# Patient Record
Sex: FEMALE | Race: WHITE
[De-identification: names, ages, dates, MRNs, and addresses within clinical notes are randomized per-mention and may not be internally consistent; named-entity substitution may affect disease eponyms.]

---

## 2020-11-12 ENCOUNTER — HOSPITAL ENCOUNTER (OUTPATIENT)
Age: 35
End: 2020-11-12
Payer: COMMERCIAL

## 2020-11-12 VITALS — BODY MASS INDEX: 46.1 KG/M2

## 2020-11-12 DIAGNOSIS — Z12.4: Primary | ICD-10-CM

## 2020-11-12 PROCEDURE — 88175 CYTOPATH C/V AUTO FLUID REDO: CPT

## 2020-11-12 PROCEDURE — G0145 SCR C/V CYTO,THINLAYER,RESCR: HCPCS

## 2020-11-12 PROCEDURE — 87624 HPV HI-RISK TYP POOLED RSLT: CPT

## 2024-06-01 ENCOUNTER — APPOINTMENT (OUTPATIENT)
Dept: RADIOLOGY | Facility: HOSPITAL | Age: 39
DRG: 389 | End: 2024-06-01
Payer: COMMERCIAL

## 2024-06-01 ENCOUNTER — HOSPITAL ENCOUNTER (INPATIENT)
Facility: HOSPITAL | Age: 39
LOS: 3 days | Discharge: HOME | DRG: 389 | End: 2024-06-04
Attending: EMERGENCY MEDICINE | Admitting: HOSPITALIST
Payer: COMMERCIAL

## 2024-06-01 DIAGNOSIS — K56.609 SMALL BOWEL OBSTRUCTION (MULTI): Primary | ICD-10-CM

## 2024-06-01 DIAGNOSIS — R11.2 NAUSEA AND VOMITING IN ADULT: ICD-10-CM

## 2024-06-01 PROBLEM — D72.829 LEUKOCYTOSIS: Status: ACTIVE | Noted: 2024-06-01

## 2024-06-01 PROBLEM — R10.84 GENERALIZED ABDOMINAL PAIN: Status: ACTIVE | Noted: 2024-06-01

## 2024-06-01 LAB
ALBUMIN SERPL BCP-MCNC: 4.6 G/DL (ref 3.4–5)
ALP SERPL-CCNC: 91 U/L (ref 33–110)
ALT SERPL W P-5'-P-CCNC: 13 U/L (ref 7–45)
ANION GAP SERPL CALC-SCNC: 12 MMOL/L (ref 10–20)
APPEARANCE UR: CLEAR
AST SERPL W P-5'-P-CCNC: 17 U/L (ref 9–39)
B-HCG SERPL-ACNC: <2 MIU/ML
BASOPHILS # BLD AUTO: 0.03 X10*3/UL (ref 0–0.1)
BASOPHILS NFR BLD AUTO: 0.3 %
BILIRUB DIRECT SERPL-MCNC: 0.1 MG/DL (ref 0–0.3)
BILIRUB SERPL-MCNC: 0.5 MG/DL (ref 0–1.2)
BILIRUB UR STRIP.AUTO-MCNC: NEGATIVE MG/DL
BUN SERPL-MCNC: 16 MG/DL (ref 6–23)
CALCIUM SERPL-MCNC: 9.6 MG/DL (ref 8.6–10.3)
CHLORIDE SERPL-SCNC: 102 MMOL/L (ref 98–107)
CO2 SERPL-SCNC: 26 MMOL/L (ref 21–32)
COLOR UR: YELLOW
CREAT SERPL-MCNC: 1 MG/DL (ref 0.5–1.05)
EGFRCR SERPLBLD CKD-EPI 2021: 74 ML/MIN/1.73M*2
EOSINOPHIL # BLD AUTO: 0.03 X10*3/UL (ref 0–0.7)
EOSINOPHIL NFR BLD AUTO: 0.3 %
ERYTHROCYTE [DISTWIDTH] IN BLOOD BY AUTOMATED COUNT: 13.8 % (ref 11.5–14.5)
GLUCOSE SERPL-MCNC: 116 MG/DL (ref 74–99)
GLUCOSE UR STRIP.AUTO-MCNC: NEGATIVE MG/DL
HCT VFR BLD AUTO: 44.2 % (ref 36–46)
HGB BLD-MCNC: 14.2 G/DL (ref 12–16)
HOLD SPECIMEN: NORMAL
IMM GRANULOCYTES # BLD AUTO: 0.03 X10*3/UL (ref 0–0.7)
IMM GRANULOCYTES NFR BLD AUTO: 0.3 % (ref 0–0.9)
KETONES UR STRIP.AUTO-MCNC: ABNORMAL MG/DL
LACTATE SERPL-SCNC: 1.1 MMOL/L (ref 0.4–2)
LEUKOCYTE ESTERASE UR QL STRIP.AUTO: NEGATIVE
LIPASE SERPL-CCNC: 10 U/L (ref 9–82)
LYMPHOCYTES # BLD AUTO: 1.38 X10*3/UL (ref 1.2–4.8)
LYMPHOCYTES NFR BLD AUTO: 11.5 %
MAGNESIUM SERPL-MCNC: 1.94 MG/DL (ref 1.6–2.4)
MCH RBC QN AUTO: 28.7 PG (ref 26–34)
MCHC RBC AUTO-ENTMCNC: 32.1 G/DL (ref 32–36)
MCV RBC AUTO: 90 FL (ref 80–100)
MONOCYTES # BLD AUTO: 0.51 X10*3/UL (ref 0.1–1)
MONOCYTES NFR BLD AUTO: 4.3 %
NEUTROPHILS # BLD AUTO: 10.02 X10*3/UL (ref 1.2–7.7)
NEUTROPHILS NFR BLD AUTO: 83.3 %
NITRITE UR QL STRIP.AUTO: NEGATIVE
NRBC BLD-RTO: 0 /100 WBCS (ref 0–0)
PH UR STRIP.AUTO: 7 [PH]
PLATELET # BLD AUTO: 236 X10*3/UL (ref 150–450)
POTASSIUM SERPL-SCNC: 4.1 MMOL/L (ref 3.5–5.3)
PROT SERPL-MCNC: 7.3 G/DL (ref 6.4–8.2)
PROT UR STRIP.AUTO-MCNC: NEGATIVE MG/DL
RBC # BLD AUTO: 4.94 X10*6/UL (ref 4–5.2)
RBC # UR STRIP.AUTO: NEGATIVE /UL
SODIUM SERPL-SCNC: 136 MMOL/L (ref 136–145)
SP GR UR STRIP.AUTO: 1.06
UROBILINOGEN UR STRIP.AUTO-MCNC: <2 MG/DL
WBC # BLD AUTO: 12 X10*3/UL (ref 4.4–11.3)

## 2024-06-01 PROCEDURE — 71045 X-RAY EXAM CHEST 1 VIEW: CPT

## 2024-06-01 PROCEDURE — 2550000001 HC RX 255 CONTRASTS: Performed by: EMERGENCY MEDICINE

## 2024-06-01 PROCEDURE — 96361 HYDRATE IV INFUSION ADD-ON: CPT

## 2024-06-01 PROCEDURE — 83605 ASSAY OF LACTIC ACID: CPT | Performed by: EMERGENCY MEDICINE

## 2024-06-01 PROCEDURE — 81003 URINALYSIS AUTO W/O SCOPE: CPT | Performed by: EMERGENCY MEDICINE

## 2024-06-01 PROCEDURE — 1100000001 HC PRIVATE ROOM DAILY

## 2024-06-01 PROCEDURE — 96374 THER/PROPH/DIAG INJ IV PUSH: CPT

## 2024-06-01 PROCEDURE — 85025 COMPLETE CBC W/AUTO DIFF WBC: CPT | Performed by: EMERGENCY MEDICINE

## 2024-06-01 PROCEDURE — 99223 1ST HOSP IP/OBS HIGH 75: CPT | Performed by: HOSPITALIST

## 2024-06-01 PROCEDURE — 74018 RADEX ABDOMEN 1 VIEW: CPT | Mod: FOREIGN READ | Performed by: RADIOLOGY

## 2024-06-01 PROCEDURE — 71045 X-RAY EXAM CHEST 1 VIEW: CPT | Mod: FOREIGN READ | Performed by: RADIOLOGY

## 2024-06-01 PROCEDURE — 2500000001 HC RX 250 WO HCPCS SELF ADMINISTERED DRUGS (ALT 637 FOR MEDICARE OP): Performed by: HOSPITALIST

## 2024-06-01 PROCEDURE — 83735 ASSAY OF MAGNESIUM: CPT | Performed by: HOSPITALIST

## 2024-06-01 PROCEDURE — 2500000005 HC RX 250 GENERAL PHARMACY W/O HCPCS: Performed by: EMERGENCY MEDICINE

## 2024-06-01 PROCEDURE — 36415 COLL VENOUS BLD VENIPUNCTURE: CPT | Performed by: EMERGENCY MEDICINE

## 2024-06-01 PROCEDURE — 84702 CHORIONIC GONADOTROPIN TEST: CPT | Performed by: EMERGENCY MEDICINE

## 2024-06-01 PROCEDURE — 99285 EMERGENCY DEPT VISIT HI MDM: CPT | Mod: 25

## 2024-06-01 PROCEDURE — 74177 CT ABD & PELVIS W/CONTRAST: CPT

## 2024-06-01 PROCEDURE — 2500000004 HC RX 250 GENERAL PHARMACY W/ HCPCS (ALT 636 FOR OP/ED): Performed by: HOSPITALIST

## 2024-06-01 PROCEDURE — 99221 1ST HOSP IP/OBS SF/LOW 40: CPT | Performed by: SURGERY

## 2024-06-01 PROCEDURE — 83690 ASSAY OF LIPASE: CPT | Performed by: EMERGENCY MEDICINE

## 2024-06-01 PROCEDURE — 80053 COMPREHEN METABOLIC PANEL: CPT | Performed by: EMERGENCY MEDICINE

## 2024-06-01 PROCEDURE — 74177 CT ABD & PELVIS W/CONTRAST: CPT | Mod: FOREIGN READ | Performed by: RADIOLOGY

## 2024-06-01 PROCEDURE — 2500000004 HC RX 250 GENERAL PHARMACY W/ HCPCS (ALT 636 FOR OP/ED): Performed by: EMERGENCY MEDICINE

## 2024-06-01 PROCEDURE — 96375 TX/PRO/DX INJ NEW DRUG ADDON: CPT

## 2024-06-01 RX ORDER — ACETAMINOPHEN 325 MG/1
650 TABLET ORAL EVERY 4 HOURS PRN
Status: DISCONTINUED | OUTPATIENT
Start: 2024-06-01 | End: 2024-06-04 | Stop reason: HOSPADM

## 2024-06-01 RX ORDER — PANTOPRAZOLE SODIUM 40 MG/10ML
40 INJECTION, POWDER, LYOPHILIZED, FOR SOLUTION INTRAVENOUS
Status: DISCONTINUED | OUTPATIENT
Start: 2024-06-02 | End: 2024-06-04 | Stop reason: HOSPADM

## 2024-06-01 RX ORDER — MORPHINE SULFATE 2 MG/ML
2 INJECTION, SOLUTION INTRAMUSCULAR; INTRAVENOUS EVERY 4 HOURS PRN
Status: DISCONTINUED | OUTPATIENT
Start: 2024-06-01 | End: 2024-06-03

## 2024-06-01 RX ORDER — SODIUM CHLORIDE 9 MG/ML
125 INJECTION, SOLUTION INTRAVENOUS CONTINUOUS
Status: DISCONTINUED | OUTPATIENT
Start: 2024-06-01 | End: 2024-06-03

## 2024-06-01 RX ORDER — PANTOPRAZOLE SODIUM 40 MG/1
40 TABLET, DELAYED RELEASE ORAL
Status: DISCONTINUED | OUTPATIENT
Start: 2024-06-02 | End: 2024-06-04 | Stop reason: HOSPADM

## 2024-06-01 RX ORDER — ENOXAPARIN SODIUM 100 MG/ML
40 INJECTION SUBCUTANEOUS EVERY 12 HOURS SCHEDULED
Status: DISCONTINUED | OUTPATIENT
Start: 2024-06-01 | End: 2024-06-04 | Stop reason: HOSPADM

## 2024-06-01 RX ORDER — ONDANSETRON HYDROCHLORIDE 2 MG/ML
4 INJECTION, SOLUTION INTRAVENOUS EVERY 8 HOURS PRN
Status: DISCONTINUED | OUTPATIENT
Start: 2024-06-01 | End: 2024-06-04 | Stop reason: HOSPADM

## 2024-06-01 RX ORDER — ACETAMINOPHEN 160 MG/5ML
650 SOLUTION ORAL EVERY 4 HOURS PRN
Status: DISCONTINUED | OUTPATIENT
Start: 2024-06-01 | End: 2024-06-04 | Stop reason: HOSPADM

## 2024-06-01 RX ORDER — ACETAMINOPHEN 650 MG/1
650 SUPPOSITORY RECTAL EVERY 4 HOURS PRN
Status: DISCONTINUED | OUTPATIENT
Start: 2024-06-01 | End: 2024-06-04 | Stop reason: HOSPADM

## 2024-06-01 RX ORDER — POLYETHYLENE GLYCOL 3350 17 G/17G
17 POWDER, FOR SOLUTION ORAL DAILY PRN
Status: DISCONTINUED | OUTPATIENT
Start: 2024-06-01 | End: 2024-06-04 | Stop reason: HOSPADM

## 2024-06-01 RX ORDER — MORPHINE SULFATE 4 MG/ML
6 INJECTION, SOLUTION INTRAMUSCULAR; INTRAVENOUS ONCE
Status: COMPLETED | OUTPATIENT
Start: 2024-06-01 | End: 2024-06-01

## 2024-06-01 RX ORDER — PROCHLORPERAZINE EDISYLATE 5 MG/ML
5 INJECTION INTRAMUSCULAR; INTRAVENOUS ONCE
Status: COMPLETED | OUTPATIENT
Start: 2024-06-01 | End: 2024-06-01

## 2024-06-01 RX ORDER — ONDANSETRON 4 MG/1
4 TABLET, ORALLY DISINTEGRATING ORAL EVERY 8 HOURS PRN
Status: DISCONTINUED | OUTPATIENT
Start: 2024-06-01 | End: 2024-06-04 | Stop reason: HOSPADM

## 2024-06-01 RX ADMIN — Medication 1 SPRAY: at 16:17

## 2024-06-01 RX ADMIN — IOHEXOL 65 ML: 350 INJECTION, SOLUTION INTRAVENOUS at 05:09

## 2024-06-01 RX ADMIN — SODIUM CHLORIDE 125 ML/HR: 9 INJECTION, SOLUTION INTRAVENOUS at 19:30

## 2024-06-01 RX ADMIN — MORPHINE SULFATE 6 MG: 4 INJECTION, SOLUTION INTRAMUSCULAR; INTRAVENOUS at 04:26

## 2024-06-01 RX ADMIN — SODIUM CHLORIDE 1000 ML: 9 INJECTION, SOLUTION INTRAVENOUS at 04:26

## 2024-06-01 RX ADMIN — SODIUM CHLORIDE 125 ML/HR: 9 INJECTION, SOLUTION INTRAVENOUS at 11:10

## 2024-06-01 RX ADMIN — PROCHLORPERAZINE EDISYLATE 5 MG: 5 INJECTION INTRAMUSCULAR; INTRAVENOUS at 04:26

## 2024-06-01 RX ADMIN — ONDANSETRON 4 MG: 2 INJECTION INTRAMUSCULAR; INTRAVENOUS at 11:06

## 2024-06-01 RX ADMIN — BENZOCAINE, BUTAMBEN, AND TETRACAINE HYDROCHLORIDE 2 SPRAY: .028; .004; .004 AEROSOL, SPRAY TOPICAL at 08:53

## 2024-06-01 SDOH — SOCIAL STABILITY: SOCIAL INSECURITY: DOES ANYONE TRY TO KEEP YOU FROM HAVING/CONTACTING OTHER FRIENDS OR DOING THINGS OUTSIDE YOUR HOME?: NO

## 2024-06-01 SDOH — SOCIAL STABILITY: SOCIAL INSECURITY: HAVE YOU HAD ANY THOUGHTS OF HARMING ANYONE ELSE?: NO

## 2024-06-01 SDOH — SOCIAL STABILITY: SOCIAL INSECURITY: DO YOU FEEL UNSAFE GOING BACK TO THE PLACE WHERE YOU ARE LIVING?: NO

## 2024-06-01 SDOH — SOCIAL STABILITY: SOCIAL INSECURITY: ABUSE: ADULT

## 2024-06-01 SDOH — SOCIAL STABILITY: SOCIAL INSECURITY: HAS ANYONE EVER THREATENED TO HURT YOUR FAMILY OR YOUR PETS?: NO

## 2024-06-01 SDOH — SOCIAL STABILITY: SOCIAL INSECURITY: ARE THERE ANY APPARENT SIGNS OF INJURIES/BEHAVIORS THAT COULD BE RELATED TO ABUSE/NEGLECT?: NO

## 2024-06-01 SDOH — SOCIAL STABILITY: SOCIAL INSECURITY: ARE YOU OR HAVE YOU BEEN THREATENED OR ABUSED PHYSICALLY, EMOTIONALLY, OR SEXUALLY BY ANYONE?: NO

## 2024-06-01 SDOH — SOCIAL STABILITY: SOCIAL INSECURITY: HAVE YOU HAD THOUGHTS OF HARMING ANYONE ELSE?: NO

## 2024-06-01 SDOH — SOCIAL STABILITY: SOCIAL INSECURITY: DO YOU FEEL ANYONE HAS EXPLOITED OR TAKEN ADVANTAGE OF YOU FINANCIALLY OR OF YOUR PERSONAL PROPERTY?: NO

## 2024-06-01 ASSESSMENT — LIFESTYLE VARIABLES
HOW MANY STANDARD DRINKS CONTAINING ALCOHOL DO YOU HAVE ON A TYPICAL DAY: PATIENT DOES NOT DRINK
HOW OFTEN DO YOU HAVE A DRINK CONTAINING ALCOHOL: NEVER
SKIP TO QUESTIONS 9-10: 1
HOW OFTEN DO YOU HAVE 6 OR MORE DRINKS ON ONE OCCASION: NEVER
AUDIT-C TOTAL SCORE: 0
AUDIT-C TOTAL SCORE: 0

## 2024-06-01 ASSESSMENT — COGNITIVE AND FUNCTIONAL STATUS - GENERAL
MOBILITY SCORE: 24
DAILY ACTIVITIY SCORE: 24
MOBILITY SCORE: 24
PATIENT BASELINE BEDBOUND: NO

## 2024-06-01 ASSESSMENT — PAIN - FUNCTIONAL ASSESSMENT
PAIN_FUNCTIONAL_ASSESSMENT: 0-10

## 2024-06-01 ASSESSMENT — ACTIVITIES OF DAILY LIVING (ADL)
ADEQUATE_TO_COMPLETE_ADL: YES
PATIENT'S MEMORY ADEQUATE TO SAFELY COMPLETE DAILY ACTIVITIES?: YES
WALKS IN HOME: INDEPENDENT
JUDGMENT_ADEQUATE_SAFELY_COMPLETE_DAILY_ACTIVITIES: YES
LACK_OF_TRANSPORTATION: PATIENT DECLINED
DRESSING YOURSELF: INDEPENDENT
GROOMING: INDEPENDENT
HEARING - RIGHT EAR: FUNCTIONAL
FEEDING YOURSELF: INDEPENDENT
BATHING: INDEPENDENT
HEARING - LEFT EAR: FUNCTIONAL
TOILETING: INDEPENDENT

## 2024-06-01 ASSESSMENT — PAIN SCALES - GENERAL
PAINLEVEL_OUTOF10: 7
PAINLEVEL_OUTOF10: 0 - NO PAIN
PAINLEVEL_OUTOF10: 1
PAINLEVEL_OUTOF10: 5 - MODERATE PAIN
PAINLEVEL_OUTOF10: 7

## 2024-06-01 ASSESSMENT — COLUMBIA-SUICIDE SEVERITY RATING SCALE - C-SSRS
1. IN THE PAST MONTH, HAVE YOU WISHED YOU WERE DEAD OR WISHED YOU COULD GO TO SLEEP AND NOT WAKE UP?: NO
2. HAVE YOU ACTUALLY HAD ANY THOUGHTS OF KILLING YOURSELF?: NO
6. HAVE YOU EVER DONE ANYTHING, STARTED TO DO ANYTHING, OR PREPARED TO DO ANYTHING TO END YOUR LIFE?: NO

## 2024-06-01 ASSESSMENT — PAIN DESCRIPTION - LOCATION: LOCATION: ABDOMEN

## 2024-06-01 ASSESSMENT — PATIENT HEALTH QUESTIONNAIRE - PHQ9
1. LITTLE INTEREST OR PLEASURE IN DOING THINGS: NOT AT ALL
2. FEELING DOWN, DEPRESSED OR HOPELESS: NOT AT ALL
SUM OF ALL RESPONSES TO PHQ9 QUESTIONS 1 & 2: 0

## 2024-06-01 NOTE — CONSULTS
General Surgery Consultation    Patient: Jennie Melendez  : 1985  MRN: [id number]  Date of Consultation: 24    Primary Care Provider: Karthikeyan Lopez PA-C  Referring Provider: Karthikeyan Caballero MD    Chief Complaint: Abdominal pain, vomiting    History of Present Illness: Jennie Melendez is a 38 y.o. old female seen at the request of Dr. Caballero for evaluation of a small bowel obstruction.  She presented to the emergency department this morning with abdominal pain, nausea and vomiting that began yesterday.  She was traveling home from [place].  She had a sandwich on the way home.  She subsequently developed abdominal pain.  They stopped at a rest stop where she had a bowel movement.  This was nonbloody and soft but formed.  She subsequently started vomiting.  Pain persisted, prompting her to come to the emergency department.  She has not passed flatus since onset of pain.  She denies any similar pain in the past.  She has no history of previous abdominal surgery.  She has no previous bowel obstructions.  She has no personal or family history of inflammatory bowel disease or intra-abdominal malignancy.  In the ED, CT scan showed dilated loops of small bowel with a transition point in the left lower quadrant consistent with a small bowel obstruction.  Nasogastric tube was placed.  Her pain remains wavelike in nature but is improved compared to when she initially came in.    Medical History:  Obesity, BMI 45    Surgical History:  No previous abdominal surgery  [place] tooth extraction    Home Medications:  No regular home medications    Allergies:  No Known Allergies    Family History:   Sister with autoimmune disease.  No family history of inflammatory bowel disease or intra-abdominal malignancy.    Social History:  Non-smoker.  No alcohol or drug use.  .  Has 5 children and is currently breast-feeding.    ROS:  Constitutional:  no fever, sweats, and chills  Cardiovascular: No chest  "pain  Respiratory: No cough or shortness of breath  Gastrointestinal: + Abdominal pain, nausea, vomiting, obstipation.  No chronic issues with diarrhea or blood in the stool.  Genitourinary: no dysuria  Musculoskeletal: no weakness or swelling  Integumentary: no jaundice  Neurological: no confusion  Endocrine: no heat or cold intolerance  Heme/Lymph: no easy bruising or bleeding    Objective:  /80   Pulse 80   Temp 37 °C (98.6 °F) (Oral)   Resp 18   Ht 1.626 m (5' 4\")   Wt 118 kg (260 lb)   SpO2 98%   BMI 44.63 kg/m²     Physical Exam:  Constitutional: No acute distress, conversant, pleasant,  at bedside  Neurologic: alert and oriented  Psych: appropriate affect  Ears, Nose, Mouth and Throat: mucus membranes moist  Pulmonary: No labored breathing  Cardiovascular: Regular rate and rhythm  Abdomen: soft, non-distended although obese with BMI 45, no surgical scars, minimally tender to palpation in the left mid abdomen, no peritonitis, nasogastric tube in place to low wall suction with translucent light bilious output  Musculoskeletal: Moves all extremities, no edema  Skin: no jaundice    Labs:  WBC 12, hemoglobin 14.2, platelets 236, CMP within normal limits, lactic acid 1.1, lipase normal    Imaging:  CT abdomen and pelvis reviewed: Dilated loops of bowel with transition point in left abdomen, consistent with small bowel obstruction.    Assessment and Plan: Jennie Melendez is a 38 y.o. old female with a small bowel obstruction.  No evidence of bowel ischemia or closed-loop nature to the obstruction.  I recommend keeping her NPO and nasogastric tube decompression.  Her electrolytes are currently within normal limits, but recommend repeating labs tomorrow and replacing any electrolytes if needed.  If no resolution within 48 hours, will obtain a small bowel follow-through study on Monday with potential surgery on Tuesday if still not resolved.    Hue Rocha MD  6/1/2024    "

## 2024-06-01 NOTE — PROGRESS NOTES
Surgery    Full consult to follow. Patient with abdominal pain and vomiting. CT scan shows SBO. Recommend admission to Hospitalist service, NPO/NGT decompression. If no resolution in 48 hours, will get SBFT on Monday and if remains obstructed would tentatively plan for surgery on Tuesday.    Hue Rocha MD  06/01/24  7:06 AM

## 2024-06-01 NOTE — H&P
History Of Present Illness  Jennie Melendez is a 38 y.o. female with no significant past medical history, G5, P5, normal delivery.  Presented to the emergency room with intractable nausea, vomiting and abdominal pain, which started 1 day ago.  Patient was driving a car from [place] yesterday, on the way she had chicken sandwich.  Followed by abdominal discomfort, nausea vomiting.  Multiple episodes of bile is vomiting.  Generalized abdominal pain, nonradiating, 8/10 in intensity.  Had 1 bowel movement yesterday prior to the symptoms.  No prior history of small bowel obstruction.  Denied any fevers or chills.  No chest pain or shortness of breath.  Patient was evaluated in the emergency room, initial BMP normal.  UA, chest x-ray negative.  White count 14.2, platelet 236.  Lactic acid 1.1.  Lipase normal.  CT of abdomen and pelvis reviewed shows dilated loops of bowel with transition point of left abdomen consistent with small bowel obstruction.  Case was discussed by ER with Dr. Rocha, general surgery.  Patient currently with NG tube, got her pain medication and nausea medication.  Feeling a little better.  No passing gas.  No fevers or chills.  No other complaints.    Past Medical History  None    Surgical History  None     Social History  She reports that she has never smoked. She does not have any smokeless tobacco history on file. She reports that she does not currently use alcohol. She reports that she does not use drugs.    Family History  No family history on file.     Allergies  Patient has no known allergies.    Review of Systems  All the 10 point review of systems have been reviewed are negative except as stated in HPI.    Physical Exam   Gen: NAD, A&O x 3, Well developed  HEENT: Normal size, shape; EOMI intact. Sclera normal.Ears normal.Oropharynx pink and moist.  Neck: Supple.no masses noted in neck, no lymphadenopathy, no tracheal deviation, non-palpable thyroid.No neck rigidity.  Chest: chest symmetry  "with respirations, no wheezes, crackles  CVS: RRR, no rubs  Abd: soft, NT/ND, bowel sounds absent.    Ext: no Clubbing/Cyanosis/edema, non-tender.Pulse 2+. Homans Negative.  Skin: Dry, warm, good condition  Neuro: grossly normal  Psy: Mood and affect appropriate   Last Recorded Vitals  Blood pressure 122/81, pulse 83, temperature 36.4 °C (97.5 °F), temperature source Temporal, resp. rate 16, height 1.626 m (5' 4\"), weight 118 kg (260 lb), SpO2 99%.    Assessment/Plan   Principal Problem:    Generalized abdominal pain  Active Problems:    Small bowel obstruction (Multi)    Nausea and vomiting in adult    Leukocytosis      Plan;    Admit to hospital, observation status.  Strict ins and outs, daily weights.  N.p.o., started on IV fluids at 125 mill per hour.  However.  Continue with NG tube with intermittent suction.  IV morphine, Tylenol for pain.  IV Zofran for nausea.  General surgery consult.  UA, chest x-ray negative.  Continue to monitor white count, reactive due to small bowel obstruction.  Labs in AM.    DVT prophylax on Lovenox.  CODE STATUS full.  Disposition in 2 to 3 days.    I spent 45 minutes in the professional and overall care of this patient.      Kiko Richards MD    "

## 2024-06-01 NOTE — ED PROVIDER NOTES
38-year-old female chief complaint of abdominal pain.  States they were out of state in [place] picking up vehicle when the symptoms began.  They ate a [employer] a little after 8 and shortly after that started having abdominal pain.  Multiple episodes of emesis.  No diarrhea.         Review of Systems     Physical Exam  Vitals and nursing note reviewed.   Constitutional:       General: She is in acute distress.      Appearance: She is well-developed.   HENT:      Head: Normocephalic and atraumatic.   Eyes:      Conjunctiva/sclera: Conjunctivae normal.   Cardiovascular:      Rate and Rhythm: Normal rate and regular rhythm.      Heart sounds: No murmur heard.  Pulmonary:      Effort: Pulmonary effort is normal. No respiratory distress.      Breath sounds: Normal breath sounds.   Abdominal:      Palpations: Abdomen is soft.      Tenderness: There is generalized abdominal tenderness.   Musculoskeletal:         General: No swelling.      Cervical back: Neck supple.   Skin:     General: Skin is warm and dry.      Capillary Refill: Capillary refill takes less than 2 seconds.   Neurological:      Mental Status: She is alert.   Psychiatric:         Mood and Affect: Mood normal.          Labs Reviewed   GRAY TOP   URINALYSIS WITH REFLEX CULTURE AND MICROSCOPIC    Narrative:     The following orders were created for panel order Urinalysis with Reflex Culture and Microscopic.  Procedure                               Abnormality         Status                     ---------                               -----------         ------                     Urinalysis with Reflex C...[[id number]]                                                 Extra Urine Gray Tube[[id number]]                                                         Please view results for these tests on the individual orders.   HUMAN CHORIONIC GONADOTROPIN, SERUM QUANTITATIVE   CBC WITH AUTO DIFFERENTIAL   BASIC METABOLIC PANEL   LIPASE   HEPATIC FUNCTION PANEL   LACTATE    URINALYSIS WITH REFLEX CULTURE AND MICROSCOPIC   EXTRA URINE GRAY TUBE        CT abdomen pelvis w IV contrast    (Results Pending)        Procedures     Medical Decision Making  Patient presents with abdominal pain and several bouts of emesis.  CT scan was concerning for developing small bowel obstruction.  I spoke to surgeon on-call Megan Sippy recommended NG tube placement and admit to the hospitalist service.    DDx: Perforated bowel, small bowel obstruction, diverticulitis         Diagnoses as of 06/08/24 2052   Small bowel obstruction (Multi)                    Karthikeyan Caballero MD  06/08/24 2052

## 2024-06-02 ENCOUNTER — APPOINTMENT (OUTPATIENT)
Dept: RADIOLOGY | Facility: HOSPITAL | Age: 39
DRG: 389 | End: 2024-06-02
Payer: COMMERCIAL

## 2024-06-02 LAB
ANION GAP SERPL CALC-SCNC: 10 MMOL/L (ref 10–20)
BUN SERPL-MCNC: 12 MG/DL (ref 6–23)
CALCIUM SERPL-MCNC: 8.4 MG/DL (ref 8.6–10.3)
CHLORIDE SERPL-SCNC: 109 MMOL/L (ref 98–107)
CO2 SERPL-SCNC: 26 MMOL/L (ref 21–32)
CREAT SERPL-MCNC: 0.89 MG/DL (ref 0.5–1.05)
EGFRCR SERPLBLD CKD-EPI 2021: 85 ML/MIN/1.73M*2
ERYTHROCYTE [DISTWIDTH] IN BLOOD BY AUTOMATED COUNT: 14.1 % (ref 11.5–14.5)
GLUCOSE SERPL-MCNC: 87 MG/DL (ref 74–99)
HCT VFR BLD AUTO: 39.1 % (ref 36–46)
HGB BLD-MCNC: 12.2 G/DL (ref 12–16)
MCH RBC QN AUTO: 28.5 PG (ref 26–34)
MCHC RBC AUTO-ENTMCNC: 31.2 G/DL (ref 32–36)
MCV RBC AUTO: 91 FL (ref 80–100)
NRBC BLD-RTO: 0 /100 WBCS (ref 0–0)
PLATELET # BLD AUTO: 199 X10*3/UL (ref 150–450)
POTASSIUM SERPL-SCNC: 3.5 MMOL/L (ref 3.5–5.3)
RBC # BLD AUTO: 4.28 X10*6/UL (ref 4–5.2)
SODIUM SERPL-SCNC: 141 MMOL/L (ref 136–145)
WBC # BLD AUTO: 6 X10*3/UL (ref 4.4–11.3)

## 2024-06-02 PROCEDURE — C9113 INJ PANTOPRAZOLE SODIUM, VIA: HCPCS | Performed by: HOSPITALIST

## 2024-06-02 PROCEDURE — 85027 COMPLETE CBC AUTOMATED: CPT | Performed by: HOSPITALIST

## 2024-06-02 PROCEDURE — 99233 SBSQ HOSP IP/OBS HIGH 50: CPT | Performed by: HOSPITALIST

## 2024-06-02 PROCEDURE — 36415 COLL VENOUS BLD VENIPUNCTURE: CPT | Performed by: HOSPITALIST

## 2024-06-02 PROCEDURE — 71045 X-RAY EXAM CHEST 1 VIEW: CPT | Performed by: RADIOLOGY

## 2024-06-02 PROCEDURE — 80048 BASIC METABOLIC PNL TOTAL CA: CPT | Performed by: HOSPITALIST

## 2024-06-02 PROCEDURE — 74018 RADEX ABDOMEN 1 VIEW: CPT | Performed by: RADIOLOGY

## 2024-06-02 PROCEDURE — 1100000001 HC PRIVATE ROOM DAILY

## 2024-06-02 PROCEDURE — 74018 RADEX ABDOMEN 1 VIEW: CPT

## 2024-06-02 PROCEDURE — 2500000001 HC RX 250 WO HCPCS SELF ADMINISTERED DRUGS (ALT 637 FOR MEDICARE OP): Performed by: HOSPITALIST

## 2024-06-02 PROCEDURE — 2500000004 HC RX 250 GENERAL PHARMACY W/ HCPCS (ALT 636 FOR OP/ED): Performed by: HOSPITALIST

## 2024-06-02 PROCEDURE — 99232 SBSQ HOSP IP/OBS MODERATE 35: CPT | Performed by: SURGERY

## 2024-06-02 RX ADMIN — ACETAMINOPHEN 650 MG: 325 TABLET ORAL at 12:51

## 2024-06-02 RX ADMIN — ACETAMINOPHEN 650 MG: 160 SOLUTION ORAL at 20:23

## 2024-06-02 RX ADMIN — PANTOPRAZOLE SODIUM 40 MG: 40 INJECTION, POWDER, FOR SOLUTION INTRAVENOUS at 08:53

## 2024-06-02 ASSESSMENT — COGNITIVE AND FUNCTIONAL STATUS - GENERAL
MOBILITY SCORE: 24
DAILY ACTIVITIY SCORE: 24

## 2024-06-02 ASSESSMENT — PAIN - FUNCTIONAL ASSESSMENT
PAIN_FUNCTIONAL_ASSESSMENT: 0-10

## 2024-06-02 ASSESSMENT — PAIN DESCRIPTION - LOCATION
LOCATION: HEAD
LOCATION: HEAD

## 2024-06-02 ASSESSMENT — PAIN SCALES - GENERAL
PAINLEVEL_OUTOF10: 3
PAINLEVEL_OUTOF10: 0 - NO PAIN
PAINLEVEL_OUTOF10: 2

## 2024-06-02 NOTE — PROGRESS NOTES
Jennie Melendez is a 38 y.o. female on day 1 of admission presenting with Generalized abdominal pain.      Subjective   Seen in room, bedside.  States her last morphine dose was yesterday evening.  She is feeling a lot better.  No abdominal pain, nausea vomiting.  No passing gas.  NG tube had 400+300+300 mL output since admission.  Per nursing no other acute events.  Objective     Last Recorded Vitals  /72   Pulse 63   Temp 36.1 °C (97 °F) (Temporal)   Resp 18   Wt 118 kg (260 lb)   SpO2 96%   Intake/Output last 3 Shifts:    Intake/Output Summary (Last 24 hours) at 6/2/2024 0946  Last data filed at 6/2/2024 0913  Gross per 24 hour   Intake 2372.92 ml   Output 725 ml   Net 1647.92 ml       Admission Weight  Weight: 118 kg (260 lb) (06/01/24 0408)    Daily Weight  06/01/24 : 118 kg (260 lb)    Image Results  XR chest abdomen for OG NG placement  Narrative: STUDY:  Single View Chest and Abdomen Radiographs;  6/1/2024 at 9:14 AM.  INDICATION:  NG placement.  COMPARISON:  CT AP 6/1/2024.  ACCESSION NUMBER(S):  [id number]  ORDERING CLINICIAN:  TECHNIQUE:  Single view of the chest and one view(s) of the abdomen  (two images).  FINDINGS:    CARDIOMEDIASTINAL SILHOUETTE:  Cardiomediastinal silhouette is normal in size and configuration.     LUNGS:  Lungs are clear.  Lung apices are excluded from the field-of-view.     ABDOMEN:  Bowel gas pattern is normal without obstruction or ileus.  There are  no convincing calculi or abnormal calcifications.  NG tube present  with tip in mid stomach.     BONES:  No acute osseous changes  Impression: NG tube present with tip in mid stomach.  Signed by Demetrius Radford MD  CT abdomen pelvis w IV contrast  Narrative: STUDY:  CT Abdomen and Pelvis with IV Contrast; 06/01/2024 5:10 AM  INDICATION:  Abdominal pain.  COMPARISON:  None.  ACCESSION NUMBER(S):  [id number]  ORDERING CLINICIAN:  CARIE ALONSO  TECHNIQUE:  CT of the abdomen and pelvis was performed.  Contiguous axial  images  were obtained at 3 mm slice thickness through the abdomen and pelvis.   Coronal and sagittal reconstructions at 3 mm slice thickness were  performed.  Omnipaque 350 65 mL was administered intravenously.    FINDINGS:  LOWER CHEST:  No cardiomegaly.  No pericardial effusion.  Lung bases are clear.     ABDOMEN:     LIVER:  No hepatomegaly.  Smooth surface contour.  Normal attenuation.     BILE DUCTS:  No intrahepatic or extrahepatic biliary ductal dilatation.     GALLBLADDER:  The gallbladder is normal.  STOMACH:  No abnormalities identified.     PANCREAS:  No masses or ductal dilatation.     SPLEEN:  No splenomegaly or focal splenic lesion.     ADRENAL GLANDS:  No thickening or nodules.     KIDNEYS AND URETERS:  Kidneys are normal in size and location.  No renal or ureteral  calculi.     PELVIS:     BLADDER:  No abnormalities identified.     REPRODUCTIVE ORGANS:  No abnormalities identified.     BOWEL:  Upper GI tract is unremarkable. There are multiple dilated loops of  small bowel, with transition to decompressed bowel occurring in the  left lower quadrant. No evidence of interloop fluid or pneumatosis.  Distal small bowel is decompressed. Colon is unremarkable.     VESSELS:  No abnormalities identified.  Abdominal aorta is normal in caliber.      PERITONEUM/RETROPERITONEUM/LYMPH NODES:  No free fluid.  No pneumoperitoneum.  No lymphadenopathy.     ABDOMINAL WALL:  No abnormalities identified.  SOFT TISSUES:   Small fat-containing umbilical hernia     BONES:  No acute fracture or aggressive osseous lesion.  Impression: Findings suspicious for developing small bowel obstruction.  Signed by Salbador Thompson MD      Physical Exam   Gen: NAD, A&O x 3, Well developed  HEENT: Normal size, shape; EOMI intact. Sclera normal.Ears normal.Oropharynx pink and moist.  Neck: Supple.no masses noted in neck, no lymphadenopathy, no tracheal deviation, non-palpable thyroid.No neck rigidity.  Chest: chest symmetry with  respirations, no wheezes, crackles  CVS: RRR, no rubs  Abd: soft, NT/ND, hypoactive bowel sounds.  Ext: no Clubbing/Cyanosis/edema, non-tender.Pulse 2+. Homans Negative.  Skin: Dry, warm, good condition  Neuro: grossly normal  Psy: Mood and affect appropriate  Assessment/Plan      Principal Problem:    Generalized abdominal pain  Active Problems:    Small bowel obstruction (Multi)    Nausea and vomiting in adult    Leukocytosis    Plan    Potassium 3.5, magnesium 1.94.  Normal white count.  6.0.  Continue on NG with intermittent suction.  NPO.  Clinically improving.  Further recommendations from general surgery.  Continue with IV Zofran, IV morphine as needed.  Continue with hydration.  Recheck labs in AM.    DVT prophylax on Lovenox.  CODE STATUS full.  Disposition in 1 to 2 days.    Total time spent 35 minutes.  Kiko Richards MD

## 2024-06-02 NOTE — PROGRESS NOTES
"General Surgery Progress Note    Abdominal pain has improved.  She passed flatus 1 time, very small.  No bowel movement.  Nasogastric tube with 725 cc output in 24 hours.  She has been ambulating in the hallways.    /72   Pulse 63   Temp 36.1 °C (97 °F) (Temporal)   Resp 18   Ht 1.626 m (5' 4\")   Wt 118 kg (260 lb)   SpO2 96%   BMI 44.63 kg/m²   NAD, sitting upright on side of bed  No labored breathing, on room air   NSR  Abdomen soft, obese but nondistended, nontender, NG tube to low intermittent suction with very light bilious/translucent output.    Intake/Output Summary (Last 24 hours) at 6/2/2024 1058  Last data filed at 6/2/2024 0913  Gross per 24 hour   Intake 2372.92 ml   Output 725 ml   Net 1647.92 ml     WBC 6  Cr 0.89, K 3.5    Patient is a 38 y.o. female with small bowel obstruction.  I suspect that she may be resolving.  We discussed the option of removing NG tube this morning with the caveat that if she does not tolerate it, it would need reinserted.  Alternatively, we discussed just leaving it in until she passes more flatus or has a bowel movement.  She would prefer the latter.  We will therefore leave NG tube to low intermittent suction today.  If she were to have a bowel movement, please notify me and we can reevaluate.  Otherwise, we will continue with NPO/NGT decompression and plan for small bowel follow-through tomorrow morning.    Hue Rocha MD  06/02/24  11:03 AM          "

## 2024-06-02 NOTE — NURSING NOTE
NG tube at 54cm, originally at 60cm when inserted. Dr. Richards notified via secure chat. STAT xray ordered to verify placement.

## 2024-06-03 ENCOUNTER — APPOINTMENT (OUTPATIENT)
Dept: RADIOLOGY | Facility: HOSPITAL | Age: 39
DRG: 389 | End: 2024-06-03
Payer: COMMERCIAL

## 2024-06-03 PROCEDURE — 74250 X-RAY XM SM INT 1CNTRST STD: CPT | Performed by: RADIOLOGY

## 2024-06-03 PROCEDURE — C9113 INJ PANTOPRAZOLE SODIUM, VIA: HCPCS | Performed by: HOSPITALIST

## 2024-06-03 PROCEDURE — 74250 X-RAY XM SM INT 1CNTRST STD: CPT

## 2024-06-03 PROCEDURE — 99233 SBSQ HOSP IP/OBS HIGH 50: CPT | Performed by: HOSPITALIST

## 2024-06-03 PROCEDURE — 2550000001 HC RX 255 CONTRASTS: Performed by: HOSPITALIST

## 2024-06-03 PROCEDURE — 1100000001 HC PRIVATE ROOM DAILY

## 2024-06-03 PROCEDURE — 99232 SBSQ HOSP IP/OBS MODERATE 35: CPT | Performed by: SURGERY

## 2024-06-03 PROCEDURE — 2500000004 HC RX 250 GENERAL PHARMACY W/ HCPCS (ALT 636 FOR OP/ED): Performed by: HOSPITALIST

## 2024-06-03 RX ADMIN — DIATRIZOATE MEGLUMINE AND DIATRIZOATE SODIUM 120 ML: 660; 100 LIQUID ORAL; RECTAL at 08:50

## 2024-06-03 RX ADMIN — DIATRIZOATE MEGLUMINE AND DIATRIZOATE SODIUM 240 ML: 660; 100 LIQUID ORAL; RECTAL at 08:47

## 2024-06-03 RX ADMIN — PANTOPRAZOLE SODIUM 40 MG: 40 INJECTION, POWDER, FOR SOLUTION INTRAVENOUS at 09:00

## 2024-06-03 RX ADMIN — SODIUM CHLORIDE 125 ML/HR: 9 INJECTION, SOLUTION INTRAVENOUS at 10:02

## 2024-06-03 SDOH — ECONOMIC STABILITY: GENERAL

## 2024-06-03 SDOH — ECONOMIC STABILITY: FOOD INSECURITY: WITHIN THE PAST 12 MONTHS, YOU WORRIED THAT YOUR FOOD WOULD RUN OUT BEFORE YOU GOT MONEY TO BUY MORE.: NEVER TRUE

## 2024-06-03 SDOH — ECONOMIC STABILITY: FOOD INSECURITY

## 2024-06-03 SDOH — ECONOMIC STABILITY: FOOD INSECURITY: WITHIN THE PAST 12 MONTHS, THE FOOD YOU BOUGHT JUST DIDN'T LAST AND YOU DIDN'T HAVE MONEY TO GET MORE.: NEVER TRUE

## 2024-06-03 SDOH — ECONOMIC STABILITY: TRANSPORTATION INSECURITY

## 2024-06-03 SDOH — ECONOMIC STABILITY: TRANSPORTATION INSECURITY
IN THE PAST 12 MONTHS, HAS THE LACK OF TRANSPORTATION KEPT YOU FROM MEDICAL APPOINTMENTS OR FROM GETTING MEDICATIONS?: NO

## 2024-06-03 SDOH — ECONOMIC STABILITY: FOOD INSECURITY: WITHIN THE PAST 12 MONTHS, YOU WORRIED THAT YOUR FOOD WOULD RUN OUT BEFORE YOU GOT THE MONEY TO BUY MORE.: NEVER TRUE

## 2024-06-03 SDOH — ECONOMIC STABILITY: TRANSPORTATION INSECURITY
IN THE PAST 12 MONTHS, HAS LACK OF TRANSPORTATION KEPT YOU FROM MEETINGS, WORK, OR FROM GETTING THINGS NEEDED FOR DAILY LIVING?: NO

## 2024-06-03 SDOH — ECONOMIC STABILITY: HOUSING INSECURITY

## 2024-06-03 SDOH — ECONOMIC STABILITY: HOUSING INSECURITY: IN THE LAST 12 MONTHS, WAS THERE A TIME WHEN YOU WERE NOT ABLE TO PAY THE MORTGAGE OR RENT ON TIME?: NO

## 2024-06-03 SDOH — ECONOMIC STABILITY: INCOME INSECURITY: IN THE LAST 12 MONTHS, WAS THERE A TIME WHEN YOU WERE NOT ABLE TO PAY THE MORTGAGE OR RENT ON TIME?: NO

## 2024-06-03 SDOH — ECONOMIC STABILITY: HOUSING INSECURITY: IN THE LAST 12 MONTHS, HOW MANY PLACES HAVE YOU LIVED?: 1

## 2024-06-03 SDOH — ECONOMIC STABILITY: HOUSING INSECURITY
IN THE LAST 12 MONTHS, WAS THERE A TIME WHEN YOU DID NOT HAVE A STEADY PLACE TO SLEEP OR SLEPT IN A SHELTER (INCLUDING NOW)?: NO

## 2024-06-03 SDOH — ECONOMIC STABILITY: HOUSING INSECURITY: IN THE PAST 12 MONTHS HAS THE ELECTRIC, GAS, OIL, OR WATER COMPANY THREATENED TO SHUT OFF SERVICES IN YOUR HOME?: NO

## 2024-06-03 SDOH — ECONOMIC STABILITY: FOOD INSECURITY: WITHIN THE PAST 12 MONTHS, THE FOOD YOU BOUGHT JUST DIDN’T LAST AND YOU DIDN’T HAVE MONEY TO GET MORE.: NEVER TRUE

## 2024-06-03 SDOH — ECONOMIC STABILITY: TRANSPORTATION INSECURITY: IN THE PAST 12 MONTHS, HAS LACK OF TRANSPORTATION KEPT YOU FROM MEDICAL APPOINTMENTS OR FROM GETTING MEDICATIONS?: NO

## 2024-06-03 ASSESSMENT — COGNITIVE AND FUNCTIONAL STATUS - GENERAL: MOBILITY SCORE: 24

## 2024-06-03 ASSESSMENT — PAIN SCALES - GENERAL
PAINLEVEL_OUTOF10: 0 - NO PAIN
PAINLEVEL_OUTOF10: 0 - NO PAIN

## 2024-06-03 ASSESSMENT — SOCIAL DETERMINANTS OF HEALTH (SDOH): IN THE PAST 12 MONTHS, HAS THE ELECTRIC, GAS, OIL, OR WATER COMPANY THREATENED TO SHUT OFF SERVICE IN YOUR HOME?: NO

## 2024-06-03 ASSESSMENT — ACTIVITIES OF DAILY LIVING (ADL): LACK_OF_TRANSPORTATION: NO

## 2024-06-03 NOTE — PROGRESS NOTES
Jennie Melendez is a 38 y.o. female on day 2 of admission presenting with Generalized abdominal pain.      Subjective   Patient is seen after her small bowel follow-through.  Continue to have some nausea.  Passing gas.  No bowel movement.  Had NG secretion 575 mL.  No fevers or chills.  No other complaints.    Objective     Last Recorded Vitals  /75   Pulse 83   Temp 36 °C (96.8 °F)   Resp 20   Wt 118 kg (260 lb)   SpO2 99%   Intake/Output last 3 Shifts:    Intake/Output Summary (Last 24 hours) at 6/3/2024 0958  Last data filed at 6/3/2024 0645  Gross per 24 hour   Intake 1191.67 ml   Output 1025 ml   Net 166.67 ml       Admission Weight  Weight: 118 kg (260 lb) (06/01/24 0408)    Daily Weight  06/01/24 : 118 kg (260 lb)    Image Results  XR chest abdomen for OG NG placement  Narrative: Interpreted By:  Petar Still,   STUDY:  XR CHEST ABDOMEN FOR OG NG PLACEMENT; 6/2/2024 1:13 pm      INDICATION:  Signs/Symptoms:NG tube placement.      COMPARISON:  None.      ACCESSION NUMBER(S):  [id number]      ORDERING CLINICIAN:  REA LALA      FINDINGS:  A single AP radiograph of the abdomen was obtained. An enteric tube  is seen with the tip overlying the mid stomach. There is a  nonobstructive bowel gas pattern present. Free intraperitoneal air  and air-fluid levels cannot be excluded without upright or decubitus  images.      Impression: Nonobstructive bowel gas pattern.      MACRO:  None      Signed by: Petar Still 6/2/2024 1:34 PM  Dictation workstation:   [id number]      Physical Exam   Gen: NAD, A&O x 3, Well developed  HEENT: Normal size, shape; EOMI intact. Sclera normal.Ears normal.Oropharynx pink and moist.  Neck: Supple.no masses noted in neck, no lymphadenopathy, no tracheal deviation, non-palpable thyroid.No neck rigidity.  Chest: chest symmetry with respirations, no wheezes, crackles  CVS: RRR, no rubs  Abd: soft, NT/ND, normal bowel sounds.  Ext: no Clubbing/Cyanosis/edema,  non-tender.Pulse 2+. Homans Negative.  Skin: Dry, warm, good condition  Neuro: grossly normal  Psy: Mood and affect appropriate    Assessment/Plan    Principal Problem:    Generalized abdominal pain  Active Problems:    Small bowel obstruction (Multi)    Nausea and vomiting in adult    Leukocytosis    Plan     Small bowel follow-through ordered.  Will continue to monitor.  Appreciate general surgery input.  Monitor electrolytes.  Continue on NG with intermittent suction.  NPO.  Clinically improving.    Continue with IV Zofran, IV morphine as needed.  Continue with hydration.  Recheck labs in AM.     DVT prophylax on Lovenox.  CODE STATUS full.  Disposition in 1 to 2 days.     Total time spent 35 minutes.    Kiko Richards MD

## 2024-06-03 NOTE — NURSING NOTE
Let Dr. Mendez, know patient stated her ears hurt and she feels like she has a possibly ear infection.

## 2024-06-03 NOTE — PROGRESS NOTES
06/03/24 0930   Discharge Planning   Living Arrangements Spouse/significant other   Support Systems Spouse/significant other   Assistance Needed none   Type of Residence Private residence   Number of Stairs to Enter Residence 3   Number of Stairs Within Residence 0   Do you have animals or pets at home? No   Who is requesting discharge planning? Patient   Home or Post Acute Services None   Patient expects to be discharged to: Home   Does the patient need discharge transport arranged? No     Care Transitions: Met with pt at the bedside and verified address, phone number and emergency contact information. PCP is Dr Lopez last seen in February and preferred pharmacy is [facility]. Pt is independent and lives at home with  and 5 children and feels safe.  Plan is to return home no new needs at this time. [facility] 24/24. ADOD 24hrs. Ctto follow. Katty Ledbetter BSN/RN-[facility] v

## 2024-06-03 NOTE — CARE PLAN
Problem: Safety  Goal: Patient will be injury free during hospitalization  Outcome: Progressing     
The patient's goals for the shift include      The clinical goals for the shift include Have a BM, pass flatus      Problem: Pain  Goal: My pain/discomfort is manageable  Outcome: Progressing     Problem: Safety  Goal: Patient will be injury free during hospitalization  Outcome: Progressing     Problem: Daily Care  Goal: Daily care needs are met  Outcome: Progressing     Problem: Psychosocial Needs  Goal: Demonstrates ability to cope with hospitalization/illness  Outcome: Progressing  Goal: Collaborate with me, my family, and caregiver to identify my specific goals  Outcome: Progressing     Problem: Discharge Barriers  Goal: My discharge needs are met  Outcome: Progressing     Problem: Pain - Adult  Goal: Verbalizes/displays adequate comfort level or baseline comfort level  Outcome: Progressing     Problem: Safety - Adult  Goal: Free from fall injury  Outcome: Progressing     Problem: Discharge Planning  Goal: Discharge to home or other facility with appropriate resources  Outcome: Progressing     Problem: Chronic Conditions and Co-morbidities  Goal: Patient's chronic conditions and co-morbidity symptoms are monitored and maintained or improved  Outcome: Progressing     Problem: Pain  Goal: Takes deep breaths with improved pain control throughout the shift  Outcome: Progressing  Goal: Turns in bed with improved pain control throughout the shift  Outcome: Progressing     Problem: Skin  Goal: Prevent/minimize sheer/friction injuries  Outcome: Progressing     
The patient's goals for the shift include      The clinical goals for the shift include Have a BM, pass flatus      Problem: Pain  Goal: My pain/discomfort is manageable  Outcome: Progressing     Problem: Safety  Goal: Patient will be injury free during hospitalization  Outcome: Progressing     Problem: Daily Care  Goal: Daily care needs are met  Outcome: Progressing     Problem: Psychosocial Needs  Goal: Demonstrates ability to cope with hospitalization/illness  Outcome: Progressing  Goal: Collaborate with me, my family, and caregiver to identify my specific goals  Outcome: Progressing     Problem: Discharge Barriers  Goal: My discharge needs are met  Outcome: Progressing     Problem: Pain - Adult  Goal: Verbalizes/displays adequate comfort level or baseline comfort level  Outcome: Progressing     Problem: Safety - Adult  Goal: Free from fall injury  Outcome: Progressing     Problem: Discharge Planning  Goal: Discharge to home or other facility with appropriate resources  Outcome: Progressing     Problem: Chronic Conditions and Co-morbidities  Goal: Patient's chronic conditions and co-morbidity symptoms are monitored and maintained or improved  Outcome: Progressing     Problem: Pain  Goal: Takes deep breaths with improved pain control throughout the shift  Outcome: Progressing  Goal: Turns in bed with improved pain control throughout the shift  Outcome: Progressing     Problem: Skin  Goal: Prevent/minimize sheer/friction injuries  Outcome: Progressing       
DC instructions

## 2024-06-03 NOTE — PROGRESS NOTES
"General Surgery Progress Note    She passed flatus 2 times overnight.  No bowel movement yet.  Still feels bloated.  NG tube output 575cc over last 24 hours, although darker in appearance today than it was yesterday.    /75   Pulse 83   Temp 36 °C (96.8 °F)   Resp 20   Ht 1.626 m (5' 4\")   Wt 118 kg (260 lb)   SpO2 99%   BMI 44.63 kg/m²   NAD, sitting upright in bed  No labored breathing, on room air   NSR  Abdomen soft, nondistended although BMI 45 somewhat limits this, nontender    Intake/Output Summary (Last 24 hours) at 6/3/2024 0641  Last data filed at 6/2/2024 1845  Gross per 24 hour   Intake 1564.59 ml   Output 575 ml   Net 989.59 ml     No labs back yet this AM    Patient is a 38 y.o. female with SBO.  Will obtain a small bowel follow-through today.  If contrast passes the colon, we can remove the NG tube and start on clear liquids.  If contrast does not pass and she remains obstructed, we will plan for operative intervention tomorrow.    Hue Rocha MD  06/03/24  6:41 AM            "

## 2024-06-03 NOTE — PROGRESS NOTES
General Surgery    SBFT shows contrast passing into colon. Subsequently had bowel movements. Will remove NGT, stop IVF, and start diet. If tolerates diet can be discharged home this afternoon. Will sign off, but available if I can be of further assistance. No need for surgical follow up.    Hue Rocha MD  06/03/24  11:45 AM

## 2024-06-04 VITALS
TEMPERATURE: 97 F | BODY MASS INDEX: 44.39 KG/M2 | HEIGHT: 64 IN | HEART RATE: 66 BPM | DIASTOLIC BLOOD PRESSURE: 74 MMHG | OXYGEN SATURATION: 96 % | WEIGHT: 260 LBS | RESPIRATION RATE: 17 BRPM | SYSTOLIC BLOOD PRESSURE: 114 MMHG

## 2024-06-04 PROBLEM — D72.829 LEUKOCYTOSIS: Status: RESOLVED | Noted: 2024-06-01 | Resolved: 2024-06-04

## 2024-06-04 PROBLEM — R11.2 NAUSEA AND VOMITING IN ADULT: Status: RESOLVED | Noted: 2024-06-01 | Resolved: 2024-06-04

## 2024-06-04 PROBLEM — R10.84 GENERALIZED ABDOMINAL PAIN: Status: RESOLVED | Noted: 2024-06-01 | Resolved: 2024-06-04

## 2024-06-04 PROBLEM — K56.609 SMALL BOWEL OBSTRUCTION (MULTI): Status: RESOLVED | Noted: 2024-06-01 | Resolved: 2024-06-04

## 2024-06-04 LAB
ANION GAP SERPL CALC-SCNC: 10 MMOL/L (ref 10–20)
BASOPHILS # BLD AUTO: 0.03 X10*3/UL (ref 0–0.1)
BASOPHILS NFR BLD AUTO: 0.5 %
BUN SERPL-MCNC: 15 MG/DL (ref 6–23)
CALCIUM SERPL-MCNC: 8.8 MG/DL (ref 8.6–10.3)
CHLORIDE SERPL-SCNC: 108 MMOL/L (ref 98–107)
CO2 SERPL-SCNC: 26 MMOL/L (ref 21–32)
CREAT SERPL-MCNC: 0.81 MG/DL (ref 0.5–1.05)
EGFRCR SERPLBLD CKD-EPI 2021: >90 ML/MIN/1.73M*2
EOSINOPHIL # BLD AUTO: 0.21 X10*3/UL (ref 0–0.7)
EOSINOPHIL NFR BLD AUTO: 3.7 %
ERYTHROCYTE [DISTWIDTH] IN BLOOD BY AUTOMATED COUNT: 13.6 % (ref 11.5–14.5)
GLUCOSE SERPL-MCNC: 92 MG/DL (ref 74–99)
HCT VFR BLD AUTO: 39.1 % (ref 36–46)
HGB BLD-MCNC: 12.5 G/DL (ref 12–16)
IMM GRANULOCYTES # BLD AUTO: 0.01 X10*3/UL (ref 0–0.7)
IMM GRANULOCYTES NFR BLD AUTO: 0.2 % (ref 0–0.9)
LYMPHOCYTES # BLD AUTO: 1.73 X10*3/UL (ref 1.2–4.8)
LYMPHOCYTES NFR BLD AUTO: 30.8 %
MCH RBC QN AUTO: 29.2 PG (ref 26–34)
MCHC RBC AUTO-ENTMCNC: 32 G/DL (ref 32–36)
MCV RBC AUTO: 91 FL (ref 80–100)
MONOCYTES # BLD AUTO: 0.66 X10*3/UL (ref 0.1–1)
MONOCYTES NFR BLD AUTO: 11.8 %
NEUTROPHILS # BLD AUTO: 2.97 X10*3/UL (ref 1.2–7.7)
NEUTROPHILS NFR BLD AUTO: 53 %
NRBC BLD-RTO: 0 /100 WBCS (ref 0–0)
PLATELET # BLD AUTO: 182 X10*3/UL (ref 150–450)
POTASSIUM SERPL-SCNC: 3.5 MMOL/L (ref 3.5–5.3)
RBC # BLD AUTO: 4.28 X10*6/UL (ref 4–5.2)
SODIUM SERPL-SCNC: 140 MMOL/L (ref 136–145)
WBC # BLD AUTO: 5.6 X10*3/UL (ref 4.4–11.3)

## 2024-06-04 PROCEDURE — 85025 COMPLETE CBC W/AUTO DIFF WBC: CPT | Performed by: HOSPITALIST

## 2024-06-04 PROCEDURE — 80048 BASIC METABOLIC PNL TOTAL CA: CPT | Performed by: HOSPITALIST

## 2024-06-04 PROCEDURE — 36415 COLL VENOUS BLD VENIPUNCTURE: CPT | Performed by: HOSPITALIST

## 2024-06-04 RX ORDER — ONDANSETRON 4 MG/1
4 TABLET, ORALLY DISINTEGRATING ORAL EVERY 8 HOURS PRN
Qty: 20 TABLET | Refills: 0 | Status: SHIPPED | OUTPATIENT
Start: 2024-06-04

## 2024-06-04 ASSESSMENT — PAIN SCALES - GENERAL: PAINLEVEL_OUTOF10: 0 - NO PAIN

## 2024-06-04 NOTE — PROGRESS NOTES
Medication Education     Medication education for Jennie Melendez was provided to the patient  for the following medication(s):  Ondansetron      Medication education provided by a Pharmacist:  ADR Counseling Medication interactions Dose, frequency, storage How to take and what to do if a dose is missed Proper dose, indication, possible ADRs Refilling the medication  How the medication works and benefits of taking it Benefits of taking the medication  Importance of compliance Any drug interactions (including OTCs and herbvals) and importance of notifying a healthcare provider of any medication changes Potential duration of therapy Proper storage of the medication(s)    Identified potential barriers to education:  None    Method(s) of Education:  Verbal Written materials provided and reviewed    An opportunity to ask questions and receive answers was provided.     Assessment of understanding the patient :  2= meets goals/outcomes    Additional Notes (if applicable):   Spoke to pt about ondansetron concerns while breastfeeding. Consulted lexicomp and Hale's medication and mother's milk book, read recommendation on ondansetron from book to patient and confirmed the medication can be passed through breast milk to infant. However, this medication is safe to give pediatric patients and was clinically deemed safe for the patient to take while breastfeeding. Reviewed this medication should only be taken PRN further lowering the risk of passing to infant.     Lukas Eller III

## 2024-06-04 NOTE — PROGRESS NOTES
06/04/24 1000   Discharge Planning   Who is requesting discharge planning? Provider   Home or Post Acute Services None   Patient expects to be discharged to: Home   Does the patient need discharge transport arranged? No     Care Transitions: Spoke with pt and discharge plan remains unchanged. Home no needs. Per care rounds pt will discharge today. [facility] 24/24. ADOD today. CT to follow. Katty Ledbetter BSN/RN-TCC

## 2024-06-04 NOTE — DISCHARGE SUMMARY
Discharge Diagnosis  Generalized abdominal pain resolved   Small bowel obstruction (Multi),RESOLVED    Nausea and vomiting in adult, resolved    Leukocytosis improved    DISCHARGE INSTRUCTIONS  Disposition: Home  DIET: Regular  Activity: As tolerated    Test Results Pending At Discharge  Pending Labs       No current pending labs.            Hospital Course    38 y.o. female with no significant past medical history, G5, P5, normal delivery.  Presented to the emergency room with intractable nausea, vomiting and abdominal pain, which started 1 day ago.  Patient was driving a car from [place] yesterday, on the way she had chicken sandwich.  Followed by abdominal discomfort, nausea vomiting.  Multiple episodes of bile is vomiting.  Generalized abdominal pain, nonradiating, 8/10 in intensity.  Had 1 bowel movement yesterday prior to the symptoms.  No prior history of small bowel obstruction.  Denied any fevers or chills.  No chest pain or shortness of breath.  Patient was evaluated in the emergency room, initial BMP normal.  UA, chest x-ray negative.  White count 14.2, platelet 236.  Lactic acid 1.1.  Lipase normal.  CT of abdomen and pelvis reviewed shows dilated loops of bowel with transition point of left abdomen consistent with small bowel obstruction  During hospitalization patient was n.p.o., NG tube was placed.  Small bowel follow-through was done which showed movement of dye into the colon.  Patient had multiple bowel movements, symptoms resolved.  Patient started on regular diet tolerated, discharged home.  Advance diet at home.  Avoid fried fatty foods, sodas.  Patient verbalized understanding about discharge instructions medications and follow-up.  Patient provided with Zofran as needed for nausea.    Pertinent Physical Exam At Time of Discharge  Physical Exam  Gen: NAD, A&O x 3, Well developed  HEENT: Normal size, shape; EOMI intact. Sclera normal.Ears normal.Oropharynx pink and moist.  Neck: Supple.no  masses noted in neck, no lymphadenopathy, no tracheal deviation, non-palpable thyroid.No neck rigidity.  Chest: chest symmetry with respirations, no wheezes, crackles  CVS: RRR, no rubs  Abd: soft, NT/ND, +BS  Ext: no Clubbing/Cyanosis/edema, non-tender.Pulse 2+. Homans Negative.  Skin: Dry, warm, good condition  Neuro: grossly normal  Psy: Mood and affect appropriate   Home Medications     Medication List      START taking these medications     ondansetron ODT 4 mg disintegrating tablet; Commonly known as:   Zofran-ODT; Take 1 tablet (4 mg) by mouth every 8 hours if needed for   nausea.       DISCHARGE MEDICATIONS     Your medication list        START taking these medications        Instructions Last Dose Given Next Dose Due   ondansetron ODT 4 mg disintegrating tablet  Commonly known as: Zofran-ODT      Take 1 tablet (4 mg) by mouth every 8 hours if needed for nausea.                 Where to Get Your Medications        These medications were sent to [facility] [address] [place] [address]      Phone: [phone]   ondansetron ODT 4 mg disintegrating tablet         Outpatient Follow-Up  PCP in 1 week    Total discharge time 35 minutes    Kiko Richards MD    (This note was generated with voice recognition software and may contain errors including spelling, grammar, syntax and misrecognition of what was dictated, that are not fully corrected)

## 2024-06-04 NOTE — DISCHARGE INSTRUCTIONS
"Small bowel obstruction - Discharge instructions    The Basics  Written by the doctors and editors at [facility]  What are discharge instructions? -- Discharge instructions are information about how to take care of yourself after getting medical care for a health problem.  What is a small bowel obstruction? -- A small bowel obstruction (\"SBO\") is a condition in which the small intestine gets blocked. \"Small bowel\" is another term for the small intestine (figure 1). In an SBO, air, fluid, and food get stuck in the intestine. They can't move through the small intestine the way they normally would.  The intestine can be partly or completely blocked in an SBO. A complete block can lead to serious problems. How long it takes for you to recover, and what you need to do, depends on whether you had surgery for the SBO.  How do I care for myself at home? -- Ask the doctor or nurse what you should do when you go home. Make sure that you understand exactly what you need to do to care for yourself. Ask questions if there is anything you do not understand.  ? Take your medicines as instructed.  ? Eat when you are hungry - If you have an upset stomach, it might help to start with clear liquids and foods that are easy to digest, like soup, pudding, toast, or eggs. You can eat other types of foods when you feel ready. If your doctor or nurse gave you specific instructions about what to eat or avoid, follow them.  Below are some additional instructions if you had surgery.  For the first 24 hours after surgery:  ? Do not drive or operate heavy or dangerous machinery.  ? Do not make any important decisions or sign any important papers.  ? Do not drink alcohol of any kind.  You should also:  ? Take care of your incision - You might have stitches, skin staples, surgical glue, or a special skin tape on your incision. If you had minimally invasive surgery, you might have more than 1 incision.  ? Keep your incision dry and covered with a " "bandage for the first 1 to 2 days after surgery. Your doctor or nurse will tell you exactly how long to keep your incision dry.  ? Once you no longer need to keep your incision dry, gently wash it with soap and water whenever you take a shower. Do not put your incision underwater, such as in a bath, pool, or lake. This can slow healing and raise your chance of getting an infection.  ? After you wash your incision, pat it dry. Your doctor or nurse will tell you if you need to put an antibiotic ointment on the incision. They will also tell you if you need to cover your incision with a bandage or gauze.  ? Always wash your hands before and after you touch your incision or bandage.  ? Increase your activity slowly - Start with short walks around your home, and walk a little more each day.  ? Keep coughing and doing deep breathing exercises for 7 to 10 days after you go home. This will help prevent lung infections. When you cough, sneeze, or do deep breathing exercises, press a pillow across your incision to support the wound and ease pain.  ? Avoid heavy lifting, sports, and swimming for at least a week or 2. (Your doctor or nurse will tell you exactly how long to avoid these or other activities.)  ? Use a stool softener to help prevent constipation, if needed. This is a common problem if you take opioid pain medicines. Follow all instructions for taking your pain medicines.  If you had \"minimally invasive\" surgery, you might have some pain in your shoulder. This is from gas the doctor put into your belly during the surgery. Walking and moving around will help reduce the gas and ease the pain.  What follow-up care do I need? -- The doctor will want to see you again to check on your progress. Go to these appointments.  If you had surgery and have stitches or staples, you will need to have them taken out. Your doctor will usually want to do this in 1 to 2 weeks. Some stitches absorb into the skin on their own and do not " need to be removed. If the doctor used skin glue or tape, it will fall off on its own. Do not pick at it or try to remove it yourself.  When should I call the doctor? -- Call for advice if:  ? You have a fever of 100.4°F (38°C) or higher, or chills.  ? You have redness or swelling around the incisions from your surgery.  ? You have bad nausea or vomiting.  ? You have very bad belly pain.  ? Your belly is swollen or bloated.  ? You cannot have a bowel movement or pass gas.  All topics are updated as new evidence becomes available and our peer review process is complete.  This topic retrieved from [employer] on: May 30, 2024.  Topic 715748 Version 1.0  Release: 32.5.3 - C32.150  © 2024 UpToDate, Inc. and/or its affiliates. All rights reserved.  figure 1: Digestive system    Consumer Information Use and Disclaimer  Disclaimer: This generalized information is a limited summary of diagnosis, treatment, and/or medication information. It is not meant to be comprehensive and should be used as a tool to help the user understand and/or assess potential diagnostic and treatment options. It does NOT include all information about conditions, treatments, medications, side effects, or risks that may apply to a specific patient. It is not intended to be medical advice or a substitute for the medical advice, diagnosis, or treatment of a health care provider based on the health care provider's examination and assessment of a patient's specific and unique circumstances. Patients must speak with a health care provider for complete information about their health, medical questions, and treatment options, including any risks or benefits regarding use of medications. This information does not endorse any treatments or medications as safe, effective, or approved for treating a specific patient. UpToDate, Inc. and its affiliates disclaim any warranty or liability relating to this information or the use thereof.The use of this information is  governed by the Terms of Use, available at https://www.woltersLangoLabuwer.com/en/know/clinical-effectiveness-terms. 2024© [employer] and its affiliates and/or licensors. All rights reserved.  © 2024 [employer] and/or its affiliates. All rights reserved.

## 2024-07-14 ENCOUNTER — APPOINTMENT (OUTPATIENT)
Dept: RADIOLOGY | Facility: HOSPITAL | Age: 39
End: 2024-07-14
Payer: COMMERCIAL

## 2024-07-14 ENCOUNTER — HOSPITAL ENCOUNTER (INPATIENT)
Facility: HOSPITAL | Age: 39
LOS: 5 days | Discharge: HOME | DRG: 880 | End: 2024-07-19
Attending: STUDENT IN AN ORGANIZED HEALTH CARE EDUCATION/TRAINING PROGRAM | Admitting: STUDENT IN AN ORGANIZED HEALTH CARE EDUCATION/TRAINING PROGRAM
Payer: COMMERCIAL

## 2024-07-14 ENCOUNTER — APPOINTMENT (OUTPATIENT)
Dept: CARDIOLOGY | Facility: HOSPITAL | Age: 39
End: 2024-07-14
Payer: COMMERCIAL

## 2024-07-14 ENCOUNTER — HOSPITAL ENCOUNTER (EMERGENCY)
Facility: HOSPITAL | Age: 39
Discharge: OTHER INSTITUTION | End: 2024-07-14
Attending: EMERGENCY MEDICINE
Payer: COMMERCIAL

## 2024-07-14 VITALS
RESPIRATION RATE: 16 BRPM | TEMPERATURE: 97.2 F | SYSTOLIC BLOOD PRESSURE: 122 MMHG | OXYGEN SATURATION: 100 % | WEIGHT: 262.35 LBS | HEART RATE: 81 BPM | DIASTOLIC BLOOD PRESSURE: 74 MMHG | BODY MASS INDEX: 45.03 KG/M2

## 2024-07-14 DIAGNOSIS — G40.901 STATUS EPILEPTICUS (MULTI): Primary | ICD-10-CM

## 2024-07-14 DIAGNOSIS — Z86.59 MENTAL STATUS CHANGE RESOLVED: ICD-10-CM

## 2024-07-14 DIAGNOSIS — H55.00 NYSTAGMUS: ICD-10-CM

## 2024-07-14 DIAGNOSIS — G35 MULTIPLE SCLEROSIS (MULTI): Primary | ICD-10-CM

## 2024-07-14 DIAGNOSIS — F41.0 PANIC DISORDER (EPISODIC PAROXYSMAL ANXIETY): ICD-10-CM

## 2024-07-14 DIAGNOSIS — G83.84 TODD'S PARALYSIS (MULTI): ICD-10-CM

## 2024-07-14 DIAGNOSIS — R56.9 SEIZURES (MULTI): ICD-10-CM

## 2024-07-14 LAB
ALBUMIN SERPL BCP-MCNC: 4.4 G/DL (ref 3.4–5)
ALP SERPL-CCNC: 77 U/L (ref 33–110)
ALT SERPL W P-5'-P-CCNC: 11 U/L (ref 7–45)
ANION GAP SERPL CALC-SCNC: 15 MMOL/L (ref 10–20)
APTT PPP: 32 SECONDS (ref 27–38)
AST SERPL W P-5'-P-CCNC: 17 U/L (ref 9–39)
BASOPHILS # BLD AUTO: 0.03 X10*3/UL (ref 0–0.1)
BASOPHILS NFR BLD AUTO: 0.5 %
BILIRUB SERPL-MCNC: 0.3 MG/DL (ref 0–1.2)
BUN SERPL-MCNC: 16 MG/DL (ref 6–23)
CALCIUM SERPL-MCNC: 9.5 MG/DL (ref 8.6–10.3)
CARDIAC TROPONIN I PNL SERPL HS: 3 NG/L (ref 0–13)
CHLORIDE SERPL-SCNC: 103 MMOL/L (ref 98–107)
CO2 SERPL-SCNC: 22 MMOL/L (ref 21–32)
CREAT SERPL-MCNC: 0.98 MG/DL (ref 0.5–1.05)
EGFRCR SERPLBLD CKD-EPI 2021: 76 ML/MIN/1.73M*2
EOSINOPHIL # BLD AUTO: 0.03 X10*3/UL (ref 0–0.7)
EOSINOPHIL NFR BLD AUTO: 0.5 %
ERYTHROCYTE [DISTWIDTH] IN BLOOD BY AUTOMATED COUNT: 13.4 % (ref 11.5–14.5)
GLUCOSE SERPL-MCNC: 96 MG/DL (ref 74–99)
HCT VFR BLD AUTO: 38.9 % (ref 36–46)
HGB BLD-MCNC: 12.3 G/DL (ref 12–16)
HOLD SPECIMEN: NORMAL
IMM GRANULOCYTES # BLD AUTO: 0.01 X10*3/UL (ref 0–0.7)
IMM GRANULOCYTES NFR BLD AUTO: 0.2 % (ref 0–0.9)
INR PPP: 1.1 (ref 0.9–1.1)
LYMPHOCYTES # BLD AUTO: 1.22 X10*3/UL (ref 1.2–4.8)
LYMPHOCYTES NFR BLD AUTO: 21 %
MCH RBC QN AUTO: 29 PG (ref 26–34)
MCHC RBC AUTO-ENTMCNC: 31.6 G/DL (ref 32–36)
MCV RBC AUTO: 92 FL (ref 80–100)
MONOCYTES # BLD AUTO: 0.42 X10*3/UL (ref 0.1–1)
MONOCYTES NFR BLD AUTO: 7.2 %
NEUTROPHILS # BLD AUTO: 4.09 X10*3/UL (ref 1.2–7.7)
NEUTROPHILS NFR BLD AUTO: 70.6 %
NRBC BLD-RTO: 0 /100 WBCS (ref 0–0)
PLATELET # BLD AUTO: 218 X10*3/UL (ref 150–450)
POTASSIUM SERPL-SCNC: 4 MMOL/L (ref 3.5–5.3)
PROT SERPL-MCNC: 7.3 G/DL (ref 6.4–8.2)
PROTHROMBIN TIME: 12.2 SECONDS (ref 9.8–12.8)
RBC # BLD AUTO: 4.24 X10*6/UL (ref 4–5.2)
SODIUM SERPL-SCNC: 136 MMOL/L (ref 136–145)
WBC # BLD AUTO: 5.8 X10*3/UL (ref 4.4–11.3)

## 2024-07-14 PROCEDURE — 1100000001 HC PRIVATE ROOM DAILY

## 2024-07-14 PROCEDURE — 2500000005 HC RX 250 GENERAL PHARMACY W/O HCPCS: Performed by: EMERGENCY MEDICINE

## 2024-07-14 PROCEDURE — 93005 ELECTROCARDIOGRAM TRACING: CPT

## 2024-07-14 PROCEDURE — 99291 CRITICAL CARE FIRST HOUR: CPT | Performed by: EMERGENCY MEDICINE

## 2024-07-14 PROCEDURE — 36415 COLL VENOUS BLD VENIPUNCTURE: CPT | Performed by: EMERGENCY MEDICINE

## 2024-07-14 PROCEDURE — 2500000004 HC RX 250 GENERAL PHARMACY W/ HCPCS (ALT 636 FOR OP/ED): Performed by: EMERGENCY MEDICINE

## 2024-07-14 PROCEDURE — 70450 CT HEAD/BRAIN W/O DYE: CPT

## 2024-07-14 PROCEDURE — 70498 CT ANGIOGRAPHY NECK: CPT | Performed by: RADIOLOGY

## 2024-07-14 PROCEDURE — 9420000001 HC RT PATIENT EDUCATION 5 MIN

## 2024-07-14 PROCEDURE — 96375 TX/PRO/DX INJ NEW DRUG ADDON: CPT | Mod: 59

## 2024-07-14 PROCEDURE — 2550000001 HC RX 255 CONTRASTS: Performed by: EMERGENCY MEDICINE

## 2024-07-14 PROCEDURE — 85730 THROMBOPLASTIN TIME PARTIAL: CPT | Performed by: EMERGENCY MEDICINE

## 2024-07-14 PROCEDURE — 85610 PROTHROMBIN TIME: CPT | Performed by: EMERGENCY MEDICINE

## 2024-07-14 PROCEDURE — 70496 CT ANGIOGRAPHY HEAD: CPT | Performed by: RADIOLOGY

## 2024-07-14 PROCEDURE — 85025 COMPLETE CBC W/AUTO DIFF WBC: CPT | Performed by: EMERGENCY MEDICINE

## 2024-07-14 PROCEDURE — 84484 ASSAY OF TROPONIN QUANT: CPT | Performed by: EMERGENCY MEDICINE

## 2024-07-14 PROCEDURE — 70496 CT ANGIOGRAPHY HEAD: CPT

## 2024-07-14 PROCEDURE — 99291 CRITICAL CARE FIRST HOUR: CPT | Mod: 25

## 2024-07-14 PROCEDURE — 96374 THER/PROPH/DIAG INJ IV PUSH: CPT | Mod: 59

## 2024-07-14 PROCEDURE — 94762 N-INVAS EAR/PLS OXIMTRY CONT: CPT

## 2024-07-14 PROCEDURE — 94664 DEMO&/EVAL PT USE INHALER: CPT

## 2024-07-14 PROCEDURE — 80053 COMPREHEN METABOLIC PANEL: CPT | Performed by: EMERGENCY MEDICINE

## 2024-07-14 RX ORDER — ENOXAPARIN SODIUM 100 MG/ML
40 INJECTION SUBCUTANEOUS EVERY 12 HOURS SCHEDULED
Status: DISCONTINUED | OUTPATIENT
Start: 2024-07-14 | End: 2024-07-17

## 2024-07-14 RX ORDER — LORAZEPAM 2 MG/ML
2 INJECTION INTRAMUSCULAR EVERY 5 MIN PRN
Status: DISCONTINUED | OUTPATIENT
Start: 2024-07-14 | End: 2024-07-19 | Stop reason: HOSPADM

## 2024-07-14 RX ORDER — LORAZEPAM 2 MG/ML
INJECTION INTRAMUSCULAR CODE/TRAUMA/SEDATION MEDICATION
Status: COMPLETED | OUTPATIENT
Start: 2024-07-14 | End: 2024-07-14

## 2024-07-14 RX ORDER — ACETAMINOPHEN 325 MG/1
650 TABLET ORAL EVERY 4 HOURS PRN
Status: DISCONTINUED | OUTPATIENT
Start: 2024-07-14 | End: 2024-07-19 | Stop reason: HOSPADM

## 2024-07-14 RX ORDER — ACETAMINOPHEN 160 MG/5ML
650 SOLUTION ORAL EVERY 4 HOURS PRN
Status: DISCONTINUED | OUTPATIENT
Start: 2024-07-14 | End: 2024-07-19 | Stop reason: HOSPADM

## 2024-07-14 RX ORDER — ONDANSETRON HYDROCHLORIDE 2 MG/ML
4 INJECTION, SOLUTION INTRAVENOUS EVERY 6 HOURS PRN
Status: DISCONTINUED | OUTPATIENT
Start: 2024-07-14 | End: 2024-07-19 | Stop reason: HOSPADM

## 2024-07-14 SDOH — SOCIAL STABILITY: SOCIAL NETWORK: ARE YOU MARRIED, WIDOWED, DIVORCED, SEPARATED, NEVER MARRIED, OR LIVING WITH A PARTNER?: MARRIED

## 2024-07-14 SDOH — HEALTH STABILITY: MENTAL HEALTH: HOW OFTEN DO YOU HAVE 6 OR MORE DRINKS ON ONE OCCASION?: NEVER

## 2024-07-14 SDOH — ECONOMIC STABILITY: TRANSPORTATION INSECURITY
IN THE PAST 12 MONTHS, HAS LACK OF TRANSPORTATION KEPT YOU FROM MEETINGS, WORK, OR FROM GETTING THINGS NEEDED FOR DAILY LIVING?: PATIENT DECLINED

## 2024-07-14 SDOH — SOCIAL STABILITY: SOCIAL INSECURITY: WITHIN THE LAST YEAR, HAVE YOU BEEN AFRAID OF YOUR PARTNER OR EX-PARTNER?: NO

## 2024-07-14 SDOH — SOCIAL STABILITY: SOCIAL NETWORK: HOW OFTEN DO YOU ATTEND CHURCH OR RELIGIOUS SERVICES?: MORE THAN 4 TIMES PER YEAR

## 2024-07-14 SDOH — HEALTH STABILITY: MENTAL HEALTH
STRESS IS WHEN SOMEONE FEELS TENSE, NERVOUS, ANXIOUS, OR CAN'T SLEEP AT NIGHT BECAUSE THEIR MIND IS TROUBLED. HOW STRESSED ARE YOU?: NOT AT ALL

## 2024-07-14 SDOH — HEALTH STABILITY: MENTAL HEALTH
HOW OFTEN DO YOU NEED TO HAVE SOMEONE HELP YOU WHEN YOU READ INSTRUCTIONS, PAMPHLETS, OR OTHER WRITTEN MATERIAL FROM YOUR DOCTOR OR PHARMACY?: NEVER

## 2024-07-14 SDOH — ECONOMIC STABILITY: FOOD INSECURITY: WITHIN THE PAST 12 MONTHS, THE FOOD YOU BOUGHT JUST DIDN'T LAST AND YOU DIDN'T HAVE MONEY TO GET MORE.: NEVER TRUE

## 2024-07-14 SDOH — HEALTH STABILITY: MENTAL HEALTH: HOW OFTEN DO YOU HAVE A DRINK CONTAINING ALCOHOL?: NEVER

## 2024-07-14 SDOH — SOCIAL STABILITY: SOCIAL INSECURITY
WITHIN THE LAST YEAR, HAVE YOU BEEN KICKED, HIT, SLAPPED, OR OTHERWISE PHYSICALLY HURT BY YOUR PARTNER OR EX-PARTNER?: NO

## 2024-07-14 SDOH — ECONOMIC STABILITY: INCOME INSECURITY: IN THE PAST 12 MONTHS, HAS THE ELECTRIC, GAS, OIL, OR WATER COMPANY THREATENED TO SHUT OFF SERVICE IN YOUR HOME?: NO

## 2024-07-14 SDOH — ECONOMIC STABILITY: HOUSING INSECURITY: AT ANY TIME IN THE PAST 12 MONTHS, WERE YOU HOMELESS OR LIVING IN A SHELTER (INCLUDING NOW)?: NO

## 2024-07-14 SDOH — SOCIAL STABILITY: SOCIAL INSECURITY: WITHIN THE LAST YEAR, HAVE YOU BEEN HUMILIATED OR EMOTIONALLY ABUSED IN OTHER WAYS BY YOUR PARTNER OR EX-PARTNER?: NO

## 2024-07-14 SDOH — SOCIAL STABILITY: SOCIAL INSECURITY
WITHIN THE LAST YEAR, HAVE TO BEEN RAPED OR FORCED TO HAVE ANY KIND OF SEXUAL ACTIVITY BY YOUR PARTNER OR EX-PARTNER?: NO

## 2024-07-14 SDOH — ECONOMIC STABILITY: INCOME INSECURITY: IN THE LAST 12 MONTHS, WAS THERE A TIME WHEN YOU WERE NOT ABLE TO PAY THE MORTGAGE OR RENT ON TIME?: PATIENT DECLINED

## 2024-07-14 SDOH — SOCIAL STABILITY: SOCIAL NETWORK
DO YOU BELONG TO ANY CLUBS OR ORGANIZATIONS SUCH AS CHURCH GROUPS UNIONS, FRATERNAL OR ATHLETIC GROUPS, OR SCHOOL GROUPS?: YES

## 2024-07-14 SDOH — HEALTH STABILITY: MENTAL HEALTH: HOW MANY STANDARD DRINKS CONTAINING ALCOHOL DO YOU HAVE ON A TYPICAL DAY?: PATIENT DOES NOT DRINK

## 2024-07-14 SDOH — ECONOMIC STABILITY: INCOME INSECURITY: HOW HARD IS IT FOR YOU TO PAY FOR THE VERY BASICS LIKE FOOD, HOUSING, MEDICAL CARE, AND HEATING?: PATIENT DECLINED

## 2024-07-14 SDOH — ECONOMIC STABILITY: TRANSPORTATION INSECURITY
IN THE PAST 12 MONTHS, HAS THE LACK OF TRANSPORTATION KEPT YOU FROM MEDICAL APPOINTMENTS OR FROM GETTING MEDICATIONS?: PATIENT DECLINED

## 2024-07-14 SDOH — SOCIAL STABILITY: SOCIAL NETWORK: HOW OFTEN DO YOU ATTENT MEETINGS OF THE CLUB OR ORGANIZATION YOU BELONG TO?: MORE THAN 4 TIMES PER YEAR

## 2024-07-14 SDOH — ECONOMIC STABILITY: FOOD INSECURITY: WITHIN THE PAST 12 MONTHS, YOU WORRIED THAT YOUR FOOD WOULD RUN OUT BEFORE YOU GOT MONEY TO BUY MORE.: NEVER TRUE

## 2024-07-14 SDOH — SOCIAL STABILITY: SOCIAL NETWORK: HOW OFTEN DO YOU GET TOGETHER WITH FRIENDS OR RELATIVES?: TWICE A WEEK

## 2024-07-14 SDOH — HEALTH STABILITY: PHYSICAL HEALTH: ON AVERAGE, HOW MANY DAYS PER WEEK DO YOU ENGAGE IN MODERATE TO STRENUOUS EXERCISE (LIKE A BRISK WALK)?: 3 DAYS

## 2024-07-14 SDOH — HEALTH STABILITY: PHYSICAL HEALTH: ON AVERAGE, HOW MANY MINUTES DO YOU ENGAGE IN EXERCISE AT THIS LEVEL?: 40 MIN

## 2024-07-14 SDOH — ECONOMIC STABILITY: HOUSING INSECURITY: IN THE PAST 12 MONTHS, HOW MANY TIMES HAVE YOU MOVED WHERE YOU WERE LIVING?: 1

## 2024-07-14 SDOH — SOCIAL STABILITY: SOCIAL NETWORK: IN A TYPICAL WEEK, HOW MANY TIMES DO YOU TALK ON THE PHONE WITH FAMILY, FRIENDS, OR NEIGHBORS?: TWICE A WEEK

## 2024-07-14 ASSESSMENT — COGNITIVE AND FUNCTIONAL STATUS - GENERAL
CLIMB 3 TO 5 STEPS WITH RAILING: A LITTLE
MOBILITY SCORE: 18
TOILETING: A LITTLE
DAILY ACTIVITIY SCORE: 18
TURNING FROM BACK TO SIDE WHILE IN FLAT BAD: A LITTLE
DRESSING REGULAR LOWER BODY CLOTHING: A LITTLE
HELP NEEDED FOR BATHING: A LITTLE
PERSONAL GROOMING: A LITTLE
WALKING IN HOSPITAL ROOM: A LITTLE
MOVING FROM LYING ON BACK TO SITTING ON SIDE OF FLAT BED WITH BEDRAILS: A LITTLE
STANDING UP FROM CHAIR USING ARMS: A LITTLE
MOVING TO AND FROM BED TO CHAIR: A LITTLE
EATING MEALS: A LITTLE
DRESSING REGULAR UPPER BODY CLOTHING: A LITTLE

## 2024-07-14 ASSESSMENT — ENCOUNTER SYMPTOMS
PHOTOPHOBIA: 0
CHILLS: 0
WEAKNESS: 1
NECK STIFFNESS: 0
FEVER: 0
DIZZINESS: 1
NECK PAIN: 0
DYSURIA: 0
PALPITATIONS: 0
SHORTNESS OF BREATH: 0
VOMITING: 0
HEMATURIA: 0
SEIZURES: 1
FACIAL ASYMMETRY: 1
ARTHRALGIAS: 0
NAUSEA: 0

## 2024-07-14 ASSESSMENT — PAIN SCALES - GENERAL
PAINLEVEL_OUTOF10: 0 - NO PAIN
PAINLEVEL_OUTOF10: 0 - NO PAIN

## 2024-07-14 ASSESSMENT — LIFESTYLE VARIABLES
SKIP TO QUESTIONS 9-10: 1
AUDIT-C TOTAL SCORE: 0

## 2024-07-14 ASSESSMENT — PAIN - FUNCTIONAL ASSESSMENT: PAIN_FUNCTIONAL_ASSESSMENT: 0-10

## 2024-07-14 NOTE — ED PROVIDER NOTES
Chief Complaint: SEIZURES/POSSIBLE STROKE    This is a 38-year-old female who presents via EMS with a possible stroke and seizures.  According to her  she has been having vertigo and dizzy episodes for the last 2 to 3 weeks she was evaluated by her primary care physician and was post to have an MRI next week.  Her  states that she woke up this morning she seems somewhat somnolent or lethargic but was able to talk without any difficulty is able to ambulate to [employer]dan[employer] while at Yazdanism she collapsed and EMS was contacted she had several seizures and route here to the hospital.  According to the paramedics when they arrived she had a left facial droop had weakness of her left arm and left leg at that time.  She presents now with recurrent seizures and possible stroke at this time.  To the  she had no fever or chills she is on no anticoagulants is on no medications currently.  Paramedics said her blood pressure was transiently elevated originally upon presentation.           Review of Systems   Constitutional:  Negative for chills and fever.   HENT: Negative.     Eyes:  Negative for photophobia.   Respiratory:  Negative for shortness of breath.    Cardiovascular:  Negative for chest pain and palpitations.   Gastrointestinal:  Negative for nausea and vomiting.   Genitourinary:  Negative for dysuria and hematuria.   Musculoskeletal:  Negative for arthralgias, neck pain and neck stiffness.   Neurological:  Positive for dizziness, seizures, facial asymmetry and weakness.   All other systems reviewed and are negative.       Physical Exam  Vitals reviewed.   Constitutional:       Appearance: She is ill-appearing and toxic-appearing.      Comments: Patient is somnolent but opens her eyes to commands at this time   HENT:      Head: Normocephalic and atraumatic.      Right Ear: Tympanic membrane normal.      Left Ear: Tympanic membrane normal.      Nose: Nose normal.   Eyes:      Extraocular Movements:  Extraocular movements intact.      Conjunctiva/sclera: Conjunctivae normal.      Pupils: Pupils are equal, round, and reactive to light.   Cardiovascular:      Rate and Rhythm: Normal rate.      Pulses: Normal pulses.      Heart sounds: No murmur heard.  Pulmonary:      Comments: Decreased breath sounds but no rales rhonchi or wheezes  Abdominal:      General: There is no distension.      Palpations: There is no mass.      Tenderness: There is no abdominal tenderness.   Musculoskeletal:         General: No swelling or tenderness.      Cervical back: Normal range of motion and neck supple. No rigidity or tenderness.      Right lower leg: No edema.      Left lower leg: No edema.   Skin:     Capillary Refill: Capillary refill takes less than 2 seconds.      Findings: No erythema or rash.   Neurological:      Mental Status: She is lethargic.      Comments: Patient opens her eyes to command she is able to follow some simple commands as far as squeezing fingers she has severe dysarthria and expressive aphasia NIH scale is approximately 13 at this time          Labs Reviewed   CBC WITH AUTO DIFFERENTIAL - Abnormal       Result Value    WBC 5.8      nRBC 0.0      RBC 4.24      Hemoglobin 12.3      Hematocrit 38.9      MCV 92      MCH 29.0      MCHC 31.6 (*)     RDW 13.4      Platelets 218      Neutrophils % 70.6      Immature Granulocytes %, Automated 0.2      Lymphocytes % 21.0      Monocytes % 7.2      Eosinophils % 0.5      Basophils % 0.5      Neutrophils Absolute 4.09      Immature Granulocytes Absolute, Automated 0.01      Lymphocytes Absolute 1.22      Monocytes Absolute 0.42      Eosinophils Absolute 0.03      Basophils Absolute 0.03     COMPREHENSIVE METABOLIC PANEL - Normal    Glucose 96      Sodium 136      Potassium 4.0      Chloride 103      Bicarbonate 22      Anion Gap 15      Urea Nitrogen 16      Creatinine 0.98      eGFR 76      Calcium 9.5      Albumin 4.4      Alkaline Phosphatase 77      Total Protein  7.3      AST 17      Bilirubin, Total 0.3      ALT 11     TROPONIN I, HIGH SENSITIVITY - Normal    Troponin I, High Sensitivity 3      Narrative:     Less than 99th percentile of normal range cutoff-  Female and children under 18 years old <14 ng/L; Male <21 ng/L: Negative  Repeat testing should be performed if clinically indicated.     Female and children under 18 years old 14-50 ng/L; Male 21-50 ng/L:  Consistent with possible cardiac damage and possible increased clinical   risk. Serial measurements may help to assess extent of myocardial damage.     >50 ng/L: Consistent with cardiac damage, increased clinical risk and  myocardial infarction. Serial measurements may help assess extent of   myocardial damage.      NOTE: Children less than 1 year old may have higher baseline troponin   levels and results should be interpreted in conjunction with the overall   clinical context.     NOTE: Troponin I testing is performed using a different   testing methodology at [facility] than at other   [facility]. Direct result comparisons should only   be made within the same method.   PROTIME-INR - Normal    Protime 12.2      INR 1.1     APTT - Normal    aPTT 32      Narrative:     The APTT is no longer used for monitoring Unfractionated Heparin Therapy. For monitoring Heparin Therapy, use the Heparin Assay.   GRAY TOP    Extra Tube Hold for add-ons.     POCT GLUCOSE METER        CT brain attack angio head and neck W and WO IV contrast   Final Result   Normal cervical carotid/vertebral CT angiogram and normal cerebral CT   angiogram.        MACRO:   None        Signed by: Fito Holly 7/14/2024 2:03 PM   Dictation workstation:   [id number]      CT brain attack head wo IV contrast   Final Result   Less than optimal imaging technique. No acute hemorrhage or edema.        MACRO:   Carlos Kwok discussed the significance and urgency of this   critical finding by epic message with  CARMEN MCDANIELS on 7/14/2024 at    1:34 pm.  (**-RCF-**) Findings:  See findings.             Signed by: Carlos Lebronjessica 7/14/2024 1:36 PM   Dictation workstation:   [id number]           Critical Care    Performed by: Evens Oro MD  Authorized by: Evens Oro MD    Critical care provider statement:     Critical care time (minutes):  47    Critical care time was exclusive of:  Separately billable procedures and treating other patients    Critical care was necessary to treat or prevent imminent or life-threatening deterioration of the following conditions: Patient has acute neurologic abnormality with status epilepticus.    Critical care was time spent personally by me on the following activities:  Development of treatment plan with patient or surrogate, discussions with consultants, evaluation of patient's response to treatment, examination of patient, obtaining history from patient or surrogate, ordering and performing treatments and interventions, ordering and review of laboratory studies, ordering and review of radiographic studies, pulse oximetry and re-evaluation of patient's condition       Medical Decision Making  Differential diagnose include brain tumor status epilepticus acute cerebral hemorrhage acute CVA.  Patient's last known well was 12:20 PM patient had initial NIH of 13 on examination.  She was slightly for several doses of Ativan IV and a CAT scan suite.  As well as oxygen therapy.  She returned to the room and stroke neurology was immediately contacted and returned our call approximately 130  felt this could be either an acute CVA or possible Demetrius's paralysis that he recommended patient receive Keppra at least 2 g intravenously and if symptoms were improving after the seizure subsided that the patient would not require TNK.  Patient was reexamined at approximately 140 by myself the emergency physician she now is able to hold her left arm and left leg up facial droop that resolved and she was able to speak more  clearly this was definitely an improvement from her baseline.  Neurology recommended not to have Dilantin or fosphenytoin Keppra and a Keppra bolus of 2.4 g IV was ordered at this time.  He was checked multiple times and her mental status improved significantly her neurologic symptoms completely resolved while in the emergency department.  Troponin was negative metabolic panel showed a normal serum electrolytes white count was 5.8 with a normal hemoglobin and hematocrit CT angio showed no acute abnormalities of the brain or large vessels of the neck at this time.  Patient was excepted by neurology to [facility] for MRI and EEG studies.    Amount and/or Complexity of Data Reviewed  ECG/medicine tests: independent interpretation performed.     Details: Twelve-lead EKG showed sinus rhythm at 85/min there is no acute ST segment elevation depressions or arrhythmias as interpreted by myself the emergency physician         Diagnoses as of 07/14/24 7930   Status epilepticus (Multi)   Demetrius's paralysis (Multi)   Mental status change resolved                    Evens Oro MD  07/14/24 2500

## 2024-07-15 ENCOUNTER — APPOINTMENT (OUTPATIENT)
Dept: RADIOLOGY | Facility: HOSPITAL | Age: 39
DRG: 880 | End: 2024-07-15
Payer: COMMERCIAL

## 2024-07-15 ENCOUNTER — APPOINTMENT (OUTPATIENT)
Dept: NEUROLOGY | Facility: HOSPITAL | Age: 39
DRG: 880 | End: 2024-07-15
Payer: COMMERCIAL

## 2024-07-15 LAB
AMPHETAMINES UR QL SCN: NORMAL
ANION GAP SERPL CALC-SCNC: 10 MMOL/L (ref 10–20)
ATRIAL RATE: 85 BPM
BARBITURATES UR QL SCN: NORMAL
BASOPHILS # BLD AUTO: 0.03 X10*3/UL (ref 0–0.1)
BASOPHILS NFR BLD AUTO: 0.6 %
BENZODIAZ UR QL SCN: NORMAL
BUN SERPL-MCNC: 12 MG/DL (ref 6–23)
BZE UR QL SCN: NORMAL
CALCIUM SERPL-MCNC: 9.3 MG/DL (ref 8.6–10.6)
CANNABINOIDS UR QL SCN: NORMAL
CHLORIDE SERPL-SCNC: 106 MMOL/L (ref 98–107)
CO2 SERPL-SCNC: 27 MMOL/L (ref 21–32)
CREAT SERPL-MCNC: 0.96 MG/DL (ref 0.5–1.05)
EGFRCR SERPLBLD CKD-EPI 2021: 78 ML/MIN/1.73M*2
EOSINOPHIL # BLD AUTO: 0.08 X10*3/UL (ref 0–0.7)
EOSINOPHIL NFR BLD AUTO: 1.5 %
ERYTHROCYTE [DISTWIDTH] IN BLOOD BY AUTOMATED COUNT: 13.7 % (ref 11.5–14.5)
FENTANYL+NORFENTANYL UR QL SCN: NORMAL
GLUCOSE SERPL-MCNC: 77 MG/DL (ref 74–99)
HCT VFR BLD AUTO: 40.9 % (ref 36–46)
HGB BLD-MCNC: 12.8 G/DL (ref 12–16)
IMM GRANULOCYTES # BLD AUTO: 0.01 X10*3/UL (ref 0–0.7)
IMM GRANULOCYTES NFR BLD AUTO: 0.2 % (ref 0–0.9)
LYMPHOCYTES # BLD AUTO: 1.77 X10*3/UL (ref 1.2–4.8)
LYMPHOCYTES NFR BLD AUTO: 34 %
MAGNESIUM SERPL-MCNC: 2.28 MG/DL (ref 1.6–2.4)
MCH RBC QN AUTO: 28.7 PG (ref 26–34)
MCHC RBC AUTO-ENTMCNC: 31.3 G/DL (ref 32–36)
MCV RBC AUTO: 92 FL (ref 80–100)
METHADONE UR QL SCN: NORMAL
MONOCYTES # BLD AUTO: 0.51 X10*3/UL (ref 0.1–1)
MONOCYTES NFR BLD AUTO: 9.8 %
NEUTROPHILS # BLD AUTO: 2.8 X10*3/UL (ref 1.2–7.7)
NEUTROPHILS NFR BLD AUTO: 53.9 %
NRBC BLD-RTO: 0 /100 WBCS (ref 0–0)
OPIATES UR QL SCN: NORMAL
OXYCODONE+OXYMORPHONE UR QL SCN: NORMAL
P AXIS: 61 DEGREES
P OFFSET: 201 MS
P ONSET: 145 MS
PCP UR QL SCN: NORMAL
PLATELET # BLD AUTO: 235 X10*3/UL (ref 150–450)
POTASSIUM SERPL-SCNC: 4.4 MMOL/L (ref 3.5–5.3)
PR INTERVAL: 146 MS
Q ONSET: 218 MS
QRS COUNT: 14 BEATS
QRS DURATION: 86 MS
QT INTERVAL: 354 MS
QTC CALCULATION(BAZETT): 421 MS
QTC FREDERICIA: 397 MS
R AXIS: 32 DEGREES
RBC # BLD AUTO: 4.46 X10*6/UL (ref 4–5.2)
SODIUM SERPL-SCNC: 139 MMOL/L (ref 136–145)
T AXIS: 33 DEGREES
T OFFSET: 395 MS
VENTRICULAR RATE: 85 BPM
WBC # BLD AUTO: 5.2 X10*3/UL (ref 4.4–11.3)

## 2024-07-15 PROCEDURE — 2550000001 HC RX 255 CONTRASTS: Performed by: STUDENT IN AN ORGANIZED HEALTH CARE EDUCATION/TRAINING PROGRAM

## 2024-07-15 PROCEDURE — 1100000001 HC PRIVATE ROOM DAILY

## 2024-07-15 PROCEDURE — 70553 MRI BRAIN STEM W/O & W/DYE: CPT

## 2024-07-15 PROCEDURE — 36415 COLL VENOUS BLD VENIPUNCTURE: CPT | Performed by: STUDENT IN AN ORGANIZED HEALTH CARE EDUCATION/TRAINING PROGRAM

## 2024-07-15 PROCEDURE — A9575 INJ GADOTERATE MEGLUMI 0.1ML: HCPCS | Performed by: STUDENT IN AN ORGANIZED HEALTH CARE EDUCATION/TRAINING PROGRAM

## 2024-07-15 PROCEDURE — 95816 EEG AWAKE AND DROWSY: CPT | Performed by: PSYCHIATRY & NEUROLOGY

## 2024-07-15 PROCEDURE — 70553 MRI BRAIN STEM W/O & W/DYE: CPT | Performed by: RADIOLOGY

## 2024-07-15 PROCEDURE — 97161 PT EVAL LOW COMPLEX 20 MIN: CPT | Mod: GP

## 2024-07-15 PROCEDURE — 80307 DRUG TEST PRSMV CHEM ANLYZR: CPT

## 2024-07-15 PROCEDURE — 83735 ASSAY OF MAGNESIUM: CPT | Performed by: STUDENT IN AN ORGANIZED HEALTH CARE EDUCATION/TRAINING PROGRAM

## 2024-07-15 PROCEDURE — 85025 COMPLETE CBC W/AUTO DIFF WBC: CPT | Performed by: STUDENT IN AN ORGANIZED HEALTH CARE EDUCATION/TRAINING PROGRAM

## 2024-07-15 PROCEDURE — 95816 EEG AWAKE AND DROWSY: CPT

## 2024-07-15 PROCEDURE — 99223 1ST HOSP IP/OBS HIGH 75: CPT

## 2024-07-15 PROCEDURE — 80048 BASIC METABOLIC PNL TOTAL CA: CPT | Performed by: STUDENT IN AN ORGANIZED HEALTH CARE EDUCATION/TRAINING PROGRAM

## 2024-07-15 PROCEDURE — 2500000004 HC RX 250 GENERAL PHARMACY W/ HCPCS (ALT 636 FOR OP/ED): Performed by: STUDENT IN AN ORGANIZED HEALTH CARE EDUCATION/TRAINING PROGRAM

## 2024-07-15 RX ORDER — GADOTERATE MEGLUMINE 376.9 MG/ML
20 INJECTION INTRAVENOUS
Status: COMPLETED | OUTPATIENT
Start: 2024-07-15 | End: 2024-07-15

## 2024-07-15 SDOH — ECONOMIC STABILITY: FOOD INSECURITY: WITHIN THE PAST 12 MONTHS, YOU WORRIED THAT YOUR FOOD WOULD RUN OUT BEFORE YOU GOT MONEY TO BUY MORE.: NEVER TRUE

## 2024-07-15 SDOH — ECONOMIC STABILITY: HOUSING INSECURITY

## 2024-07-15 SDOH — ECONOMIC STABILITY: TRANSPORTATION INSECURITY: IN THE PAST 12 MONTHS, HAS LACK OF TRANSPORTATION KEPT YOU FROM MEDICAL APPOINTMENTS OR FROM GETTING MEDICATIONS?: NO

## 2024-07-15 SDOH — ECONOMIC STABILITY: FOOD INSECURITY

## 2024-07-15 SDOH — ECONOMIC STABILITY: FOOD INSECURITY: WITHIN THE PAST 12 MONTHS, THE FOOD YOU BOUGHT JUST DIDN'T LAST AND YOU DIDN'T HAVE MONEY TO GET MORE.: NEVER TRUE

## 2024-07-15 SDOH — ECONOMIC STABILITY: INCOME INSECURITY: IN THE LAST 12 MONTHS, WAS THERE A TIME WHEN YOU WERE NOT ABLE TO PAY THE MORTGAGE OR RENT ON TIME?: NO

## 2024-07-15 SDOH — ECONOMIC STABILITY: TRANSPORTATION INSECURITY

## 2024-07-15 SDOH — ECONOMIC STABILITY: HOUSING INSECURITY: IN THE LAST 12 MONTHS, WAS THERE A TIME WHEN YOU WERE NOT ABLE TO PAY THE MORTGAGE OR RENT ON TIME?: NO

## 2024-07-15 SDOH — ECONOMIC STABILITY: FOOD INSECURITY: WITHIN THE PAST 12 MONTHS, YOU WORRIED THAT YOUR FOOD WOULD RUN OUT BEFORE YOU GOT THE MONEY TO BUY MORE.: NEVER TRUE

## 2024-07-15 SDOH — ECONOMIC STABILITY: GENERAL

## 2024-07-15 SDOH — ECONOMIC STABILITY: HOUSING INSECURITY: IN THE PAST 12 MONTHS HAS THE ELECTRIC, GAS, OIL, OR WATER COMPANY THREATENED TO SHUT OFF SERVICES IN YOUR HOME?: NO

## 2024-07-15 ASSESSMENT — COGNITIVE AND FUNCTIONAL STATUS - GENERAL
PERSONAL GROOMING: A LITTLE
CLIMB 3 TO 5 STEPS WITH RAILING: A LITTLE
MOBILITY SCORE: 22
WALKING IN HOSPITAL ROOM: A LITTLE
DRESSING REGULAR LOWER BODY CLOTHING: A LITTLE
HELP NEEDED FOR BATHING: A LITTLE
DRESSING REGULAR UPPER BODY CLOTHING: A LITTLE
TOILETING: A LITTLE
EATING MEALS: A LITTLE
WALKING IN HOSPITAL ROOM: A LITTLE
MOBILITY SCORE: 22
CLIMB 3 TO 5 STEPS WITH RAILING: A LITTLE
DAILY ACTIVITIY SCORE: 18

## 2024-07-15 ASSESSMENT — PAIN - FUNCTIONAL ASSESSMENT
PAIN_FUNCTIONAL_ASSESSMENT: 0-10
PAIN_FUNCTIONAL_ASSESSMENT: 0-10

## 2024-07-15 ASSESSMENT — SOCIAL DETERMINANTS OF HEALTH (SDOH): IN THE PAST 12 MONTHS, HAS THE ELECTRIC, GAS, OIL, OR WATER COMPANY THREATENED TO SHUT OFF SERVICE IN YOUR HOME?: NO

## 2024-07-15 ASSESSMENT — PAIN SCALES - GENERAL
PAINLEVEL_OUTOF10: 0 - NO PAIN

## 2024-07-15 ASSESSMENT — ACTIVITIES OF DAILY LIVING (ADL): LACK_OF_TRANSPORTATION: NO

## 2024-07-15 NOTE — PROGRESS NOTES
"Pharmacy Medication History Review    Jennie Melendez is a 38 y.o. female admitted for Seizures (Multi). Pharmacy reviewed the patient's [id number] medications and allergies for accuracy.    The list below reflects the updated PTA list. Comments regarding how patient may be taking medications differently can be found in the Admit Orders Activity.  Prior to Admission Medications   Prescriptions Last Dose Informant   ondansetron ODT (Zofran-ODT) 4 mg disintegrating tablet  Other   Sig: Take 1 tablet (4 mg) by mouth every 8 hours if needed for nausea.      Facility-Administered Medications: None        The list below reflects the updated allergy list. Please review each documented allergy for additional clarification and justification.  Allergies  Reviewed by Nelly Farr PharmD on 7/15/2024   No Known Allergies           Sources used to complete the med history include:  Out patient fill history - none  OARRS: checked 07/15/24  Concerns: No - no fill hx  Chart Review  ED admit note    Medication Reconciliation  Added: none  Changed: none  Removed: none      Below are additional concerns with the patient's PTA list.  None- pt not currently taking medications      Patient accepts M2B at discharge. Pharmacy has been updated to [facility].  Please ensure Expected Discharge date is accurate under the Discharge Plan to ensure timely delivery. Please reach out to M2B team for any same-day discharge changes.  Juan: \"Call Meds to Beds\", Jesse: Ext#[phone]    Godwin LAND  PGY-1 Resident Pharmacist  Please reach out via Secure Chat for questions, or if no response call [phone] or vocera [facility]    "

## 2024-07-15 NOTE — PROGRESS NOTES
"Jennie Melendez is a 38 y.o. female on day 1 of admission presenting with Seizures (Multi).    Subjective     NAEON.  Patient states she had occipital headache overnight, \"felt like my head was in a vice\".  Had MRIb overnight with few punctate T2/FLAIR hyperintensities in the white matter, 2 small enhancing foci in the L posterior parietal and occipital lobes.       Objective     Physical Exam   Gen: NAD, resting comfortably in bed  Resp: good air exchange bilaterally, normal respiratory effort on RA  CV: RRR, no murmurs/gallops/rubs  GI: soft, nondistended, nontender  Skin: Warm, dry, no rashes on visualized skin  Psych: appropriate mood and affect     Neuro:  CN:  II: Visual fields full to finger count  II/III: PERRL  III/IV/VI: EOM intact to smooth pursuit and saccades, end-gaze nystagmus on both far left and far right gaze, upbeating nystagmus on up-gaze  V: facial sensation intact to light touch  VII: symmetric facial muscles  VIII: hearing intact to conversation  IX/X: palate elevates symmetrically  XI: tongue midline  XII: 5/5 strength bilateral SCM and trapezius  Motor:  5/5 strength in bilateral upper and lower extremities  Triceps/biceps/brachioradialis/ankle reflexes 2+ bilaterally  Patellar reflexes 3+ bilaterally with positive suprapatellar  Babinski mute bilaterally  Sensory:  Sensation intact to light touch in bilateral upper and lower extremities  Cerebellar fx:  Finger-nose and heel-shin intact bilaterally      Last Recorded Vitals  Blood pressure 109/71, pulse 66, temperature 36.3 °C (97.3 °F), temperature source Temporal, resp. rate 16, height 1.626 m (5' 4\"), weight 119 kg (262 lb), last menstrual period 06/30/2024, SpO2 97%.  Intake/Output last 3 Shifts:  No intake/output data recorded.    Medications   Scheduled medications   enoxaparin, 40 mg, subcutaneous, q12h [facility]ER[facility]T      PRN medications   PRN medications: acetaminophen **OR** acetaminophen, LORazepam, ondansetron     Recent labs  WBC   Date " Value Ref Range Status   07/14/2024 5.8 4.4 - 11.3 x10*3/uL Final   06/04/2024 5.6 4.4 - 11.3 x10*3/uL Final   06/02/2024 6.0 4.4 - 11.3 x10*3/uL Final     Hemoglobin   Date Value Ref Range Status   07/14/2024 12.3 12.0 - 16.0 g/dL Final   06/04/2024 12.5 12.0 - 16.0 g/dL Final   06/02/2024 12.2 12.0 - 16.0 g/dL Final     Platelets   Date Value Ref Range Status   07/14/2024 218 150 - 450 x10*3/uL Final   06/04/2024 182 150 - 450 x10*3/uL Final   06/02/2024 199 150 - 450 x10*3/uL Final     Glucose   Date Value Ref Range Status   07/14/2024 96 74 - 99 mg/dL Final   06/04/2024 92 74 - 99 mg/dL Final   06/02/2024 87 74 - 99 mg/dL Final     Sodium   Date Value Ref Range Status   07/14/2024 136 136 - 145 mmol/L Final   06/04/2024 140 136 - 145 mmol/L Final   06/02/2024 141 136 - 145 mmol/L Final     Potassium   Date Value Ref Range Status   07/14/2024 4.0 3.5 - 5.3 mmol/L Final   06/04/2024 3.5 3.5 - 5.3 mmol/L Final   06/02/2024 3.5 3.5 - 5.3 mmol/L Final     Chloride   Date Value Ref Range Status   07/14/2024 103 98 - 107 mmol/L Final   06/04/2024 108 (H) 98 - 107 mmol/L Final   06/02/2024 109 (H) 98 - 107 mmol/L Final     Urea Nitrogen   Date Value Ref Range Status   07/14/2024 16 6 - 23 mg/dL Final   06/04/2024 15 6 - 23 mg/dL Final   06/02/2024 12 6 - 23 mg/dL Final     Creatinine   Date Value Ref Range Status   07/14/2024 0.98 0.50 - 1.05 mg/dL Final   06/04/2024 0.81 0.50 - 1.05 mg/dL Final   06/02/2024 0.89 0.50 - 1.05 mg/dL Final     Magnesium   Date Value Ref Range Status   06/01/2024 1.94 1.60 - 2.40 mg/dL Final     Calcium   Date Value Ref Range Status   07/14/2024 9.5 8.6 - 10.3 mg/dL Final   06/04/2024 8.8 8.6 - 10.3 mg/dL Final   06/02/2024 8.4 (L) 8.6 - 10.3 mg/dL Final     Comment:     Confirmed by repeat analysis     Albumin   Date Value Ref Range Status   07/14/2024 4.4 3.4 - 5.0 g/dL Final   06/01/2024 4.6 3.4 - 5.0 g/dL Final         Imaging   ECG 12 lead    Result Date: 7/15/2024  Normal sinus rhythm  Normal ECG When compared with ECG of 08-APR-2020 22:45, Previous ECG has undetermined rhythm, needs review    MR brain w and wo IV contrast    Result Date: 7/15/2024  STUDY: MR BRAIN W AND WO IV CONTRAST;  7/15/2024 2:16 am   INDICATION: Signs/Symptoms:Sustained multidirectional nsytagmus, gait ataxia.   COMPARISON: CT head and CTA head and neck on 07/14/2024.   ACCESSION NUMBER(S): [id number]   ORDERING CLINICIAN: AASEF SHAIKH   TECHNIQUE: Axial T2, FLAIR, DWI, gradient echo T2 and sagittal and coronal T1 weighted images of brain were acquired. Post contrast T1 weighted images were acquired after administration of 20 mL Dotarem gadolinium based intravenous contrast.   FINDINGS: CSF Spaces: The ventricles, sulci and basal cisterns are within normal limits.   Parenchyma: There is no diffusion restriction abnormality to suggest acute infarct.  Multiple scattered nonspecific T2/FLAIR hyperintense foci throughout periventricular and subcortical white matter. Few of these foci within the left posterior parietal lobe and occipital lobes demonstrate enhancement (series 15, image images 99, 100, 108, 143, and 146). There is no mass effect or midline shift.   Paranasal Sinuses and Mastoids: Visualized paranasal sinuses and mastoid air cells are unremarkable.       Multiple scattered T2/FLAIR hyperintense foci throughout the periventricular and subcortical white matter, with few of these foci within the left posterior parietal and occipital lobes demonstrating enhancement. These findings are nonspecific, however can be seen in the setting of demyelinating disease. No evidence of acute infarct or intracranial mass effect.   I personally reviewed the images/study and I agree with the findings as stated by Dr. Chuck Cobian M.D. This study was interpreted at [facility], [place], [place].   MACRO: None     Dictation workstation:   [id number]    CT brain attack angio head and neck W and WO IV  contrast    Result Date: 7/14/2024  Interpreted By:  Fito Holly, STUDY: CT BRAIN ATTACK ANGIO HEAD AND NECK W AND WO IV CONTRAST;  7/14/2024 1:39 pm   INDICATION: Signs/Symptoms:LEFT HEMIPAPRESIS.   COMPARISON: None.   ACCESSION NUMBER(S): [id number]   ORDERING CLINICIAN: CARMEN MCDANIELS   TECHNIQUE: 69 mL Omnipaque 350 were administered intravenously and axial images of the head and neck were acquired.  Coronal, sagittal, and 3-D reconstructions were provided for review. Carotid stenoses were determined using modified NASCET criteria.   FINDINGS:     CTA HEAD FINDINGS:   Anterior circulation: The bilateral intracranial internal carotid arteries, bilateral carotid terminals, bilateral proximal anterior and middle cerebral arteries are normal.   Posterior circulation: Bilateral intracranial vertebral arteries, vertebrobasilar junction, basilar artery and proximal posterior cerebral arteries are normal.   CTA NECK FINDINGS:   Right carotid vessels: The common carotid artery is normal. The carotid bifurcation is normal. The internal carotid artery in the neck is normal. There is 0% stenosis  .   Left carotid vessels: The common carotid artery is normal. The carotid bifurcation is normal. The internal carotid artery in the neck is normal. There is 0% stenosis  .   Vertebral vessels:  The visualized segments of the cervical vertebral arteries are normal in caliber.         Normal cervical carotid/vertebral CT angiogram and normal cerebral CT angiogram.   MACRO: None   Signed by: Fito Holly 7/14/2024 2:03 PM Dictation workstation:   [id number]    CT brain attack head wo IV contrast    Result Date: 7/14/2024  Interpreted By:  Carlos Kwok, STUDY: CT BRAIN ATTACK HEAD WO IV CONTRAST;  7/14/2024 1:14 pm   INDICATION: Signs/Symptoms:seizure.   COMPARISON: No prior examination available for comparison. If a prior examination becomes available, this examination will be compared to the prior study. Addendum report will be  issued.   ACCESSION NUMBER(S): [id number]   ORDERING CLINICIAN: CARMEN MCDANIELS   TECHNIQUE: Noncontrast axial CT scan of head was performed. Angled reformats in brain and bone windows were generated. The images were reviewed in bone, brain, blood and soft tissue windows.   FINDINGS: Less than optimal imaging technique. No acute hemorrhage or edema. No abnormal extra-axial fluid collections. Diffuse loss of the gray-white differentiation is likely due to technique unlike the result of global edema. Does this patient have clinical findings of global cerebral edema? Please refer to the CT angiogram report for additional details.   Orbits and paranasal sinuses are clear. No skull fracture.       Less than optimal imaging technique. No acute hemorrhage or edema.   MACRO: Carlos Kwok discussed the significance and urgency of this critical finding by epic message with  CARMEN MCDANIELS on 7/14/2024 at 1:34 pm.  (**-RCF-**) Findings:  See findings.     Signed by: Carlos Kwok 7/14/2024 1:36 PM Dictation workstation:   [id number]               Assessment/Plan   Principal Problem:    Seizures (Multi)    Ms. Melendez is a 37yo woman with no significant PMHx who presented to  on 7/14 with c/f first-time seizure. 3 weeks prior to presentation the patient began experiencing constant vertigo and dizziness. Then on 7/14 the patient had sudden-onset flushing, lightheadedness, and dizziness followed by collapse with brief LOC (no post-ictal confusion/lethargy) that was then followed by generalized BUE shaking that progressed to BLE shaking. At OSH the patient was noted to have R gaze deviation and L sided weakness c/f Demetrius's paralysis so she received 2x 1 mg ativan and was loaded with 2400 mg Keppra. The weakness improved over several hours. However, given the semiology and patient's intact consciousness during these events, these episodes were likely nonepileptic.    MRIb with few punctate T2/FLAIR white matter hyperintensities, 2  small enhancing foci in the L posterior parietal and occipital lobes. These MRIb findings could be consistent with a possible new diagnosis of demyelinating disease, but no lesions in brainstem/cerebellum to explain nystagmus. Will obtain MRIb thin cuts and MRI C-spine.    #vertigo with horizontal and vertical nystagmus  #scattered T2/FLAIR white matter hyperintensities with enhancing foci in L posterior parietal and occipital lobes  #c/f demylinating disease  [ ] obtain MRIb thin cuts w/wo  [ ] obtain MRI C/T-spine w/wo  [ ] check Utox    #c/f seizure  :: s/p 2400 mg keppra load  :: semiology inconsistent with an epileptic event  [ ] follow-up routine EEG    F: PRN  E: PRN  N: Regular  A: 2x pIV  DVT ppx: lovenox    Code: Full Code  NOK:   Jim Melendez (Spouse)  [phone] (Home Phone)        Monse Horan MD  Neurology, PGY-1

## 2024-07-15 NOTE — PROGRESS NOTES
Physical Therapy    Physical Therapy Evaluation    Patient Name: Jennie Melendez  MRN: [id number]  Today's Date: 7/15/2024   Time Calculation  Start Time: 1425  Stop Time: 1439  Time Calculation (min): 14 min    Assessment/Plan   PT Assessment  End of Session Communication: Bedside nurse  Assessment Comment: Pt participated well in therapy with no inc in pain and no reports of fatigue. Pt AMB 75ft w/ FWW and 260ft w/ no device and close supervision. Pt a/descended 4 steps with no device and no railing with close supervision. Pt is a low fall risk on the Tinetti Balance Test. Pt has no acute PT needs at this time and does not require PT post discharge. PT will sign off at this time but will remain available for reconsult should there be any changes.  End of Session Patient Position: Bed, 3 rail up, Alarm off, not on at start of session  IP OR SWING BED PT PLAN  Inpatient or Swing Bed: Inpatient  PT Plan  PT Plan: PT Eval only  PT Eval Only Reason: Safe to return home  PT Frequency: PT eval only  PT Discharge Recommendations: No further acute PT, No PT needed after discharge  Equipment Recommended upon Discharge:  (none)  PT Recommended Transfer Status: Stand by assist  PT - OK to Discharge: Yes (eval complete and discharge recommendations made)      Subjective   General Visit Information:  General  Reason for Referral: presenting with Seizures (Multi).  Past Medical History Relevant to Rehab: no significant PMHx  Family/Caregiver Present: Yes  Caregiver Feedback: supportive  present  Prior to Session Communication: Bedside nurse  Patient Position Received: Bed, 3 rail up, Alarm off, not on at start of session  General Comment: Pt cleared for PT. Pt asleep upon entry but awoke to gentle VCs. Alert and agreeable to therapy. +PIV x2  Home Living:  Home Living  Type of Home: House  Lives With: Spouse, Dependent children (5 children and )  Home Adaptive Equipment: None  Home Layout: One level (w/ basement (pt  office in basement))  Home Access: Stairs to enter without rails  Entrance Stairs-Rails: None  Entrance Stairs-Number of Steps: 2  Prior Level of Function:  Prior Function Per Pt/Caregiver Report  Level of [place]: Independent with ADLs and functional transfers, Independent with homemaking with ambulation  Prior Function Comments: +drive, -falls  Precautions:  Precautions  Medical Precautions: No known precautions/limitation    Objective   Pain:  Pain Assessment  Pain Assessment: 0-10  0-10 (Numeric) Pain Score: 0 - No pain  Cognition:  Cognition  Overall Cognitive Status: Within Functional Limits  Orientation Level: Oriented X4    General Assessments:  Activity Tolerance  Endurance: Endurance does not limit participation in activity    Sensation  Light Touch: No apparent deficits    Strength  Strength Comments: WFL  Coordination  Movements are Fluid and Coordinated: Yes (except slight deficits in RUE)  Finger to Nose: Impaired, Dysmetria (slight dysmetria in RUE, LUE intact)  Rapid Alternating Movements: Intact  Alternating Toe Taps: Intact  Heel to Shin: Intact    Postural Control  Postural Control: Within Functional Limits    Static Sitting Balance  Static Sitting-Balance Support: No upper extremity supported, Feet supported  Static Sitting-Level of Assistance: Independent  Dynamic Sitting Balance  Dynamic Sitting-Balance Support: No upper extremity supported, Feet supported  Dynamic Sitting-Balance: Forward lean  Dynamic Sitting-Comments: independent    Static Standing Balance  Static Standing-Balance Support: No upper extremity supported  Static Standing-Level of Assistance: Independent  Dynamic Standing Balance  Dynamic Standing-Balance Support: No upper extremity supported  Dynamic Standing-Balance: Turning  Dynamic Standing-Comments: close supervision  Functional Assessments:  Bed Mobility  Bed Mobility: Yes  Bed Mobility 1  Bed Mobility 1: Supine to sitting, Sitting to supine  Level of Assistance 1:  Modified independent  Bed Mobility Comments 1: HOB partially elevated    Transfers  Transfer: Yes  Transfer 1  Transfer From 1: Bed to, Stand to  Transfer to 1: Stand, Bed  Technique 1: Sit to stand, Stand to sit  Transfer Device 1:  (none)  Transfer Level of Assistance 1: Independent  Trials/Comments 1: x2 trials    Ambulation/Gait Training  Ambulation/Gait Training Performed: Yes  Ambulation/Gait Training 1  Surface 1: Level tile  Device 1: Rolling walker  Gait Support Devices: Gait belt  Assistance 1: Close supervision  Quality of Gait 1: Decreased step length  Comments/Distance (ft) 1: 75ft  Ambulation/Gait Training 2  Surface 2: Level tile  Device 2: No device  Gait Support Devices: Gait belt  Assistance 2: Close supervision  Quality of Gait 2: Decreased step length  Comments/Distance (ft) 2: 260ft    Stairs  Stairs: Yes  Stairs  Rails 1: None (Comment)  Curb Step 1: No  Device 1: No device  Support Devices 1: Gait belt  Assistance 1: Close supervision  Comment/Number of Steps 1: 4 steps  Extremity/Trunk Assessments:  Strength RLE  R Hip Flexion: 4+/5  R Knee Flexion: 5/5  R Knee Extension: 5/5  R Ankle Dorsiflexion: 5/5  R Ankle Plantar Flexion: 5/5  Strength LLE  L Hip Flexion: 4+/5  L Knee Flexion: 5/5  L Knee Extension: 5/5  L Ankle Dorsiflexion: 5/5  L Ankle Plantar Flexion: 5/5  Outcome Measures:  [facility] Basic Mobility  Turning from your back to your side while in a flat bed without using bedrails: None  Moving from lying on your back to sitting on the side of a flat bed without using bedrails: None  Moving to and from bed to chair (including a wheelchair): None  Standing up from a chair using your arms (e.g. wheelchair or bedside chair): None  To walk in hospital room: A little  Climbing 3-5 steps with railing: A little  Basic Mobility - Total Score: 22    Tinetti  Sitting Balance: Steady, safe  Arises: Able without using arms  Attempts to Arise: Able to arise, one attempt  Immediate Standing Balance  (First 5 Seconds): Steady without walker or other support  Standing Balance: Narrow stance without support  Nudged: Steady without walker or other support  Eyes Closed: Steady  Turned 360 Degrees: Steadiness: Steady  Turned 360 Degrees: Continuity of Steps: Continuous  Sitting Down: Safe, smooth motion  Balance Score: 16  Initiation of Gait: No hesitancy  Step Height: R Swing Foot: Right foot complete clears floor  Step Length: R Swing Foot: Passes left stance foot  Step Height: L Swing Foot: Left foot complete clears floor  Step Length: L Swing Foot: Passes right stance foot  Step Symmetry: Right and left step appear equal  Step Continuity: Steps appear continuous  Path: Straight without walking aid  Trunk: No sway, no flexion, no use of arms, no walking aid  Walking Time: Heels almost touching while walking  Gait Score: 12  Total Score: 28        Education Documentation  Body Mechanics, taught by YAW Moncada at 7/15/2024  3:47 PM.  Learner: Patient  Readiness: Acceptance  Method: Explanation  Response: Verbalizes Understanding    Mobility Training, taught by YAW Moncada at 7/15/2024  3:47 PM.  Learner: Patient  Readiness: Acceptance  Method: Explanation  Response: Verbalizes Understanding    Education Comments  No comments found.

## 2024-07-15 NOTE — H&P
History Of Present Illness  Jennie Melendez is a 38 y.o. female with no PMH presenting with 1st time seizure.    Per , patient has been having vertigo and dizziness for past 2-3 weeks. Evaluated by PCP and scheduled to have MRI. She woke up this morning lethargic but was able to talk and ambulate to [employer]. At Syn[employer] she collapsed.  Noted to have seizures en route but EMS noticed L side weakness, L FD, and R gaze deviation. BAT called, NIHSS 13 with L side weakness. CTA neg. Received ativan. Thought to have status epilepticus with efra's paralysis. Received 2.4g keppra bolus and returned to baseline. WBC 5.8, lactate not drawn.      Past Medical History  She has no past medical history on file.    Medications    Current Facility-Administered Medications:     fosphenytoin (Cerebyx) 1,000 mg PE in sodium chloride 0.9% 100 mL IV, 1,000 mg PE, intravenous, Once, Evens Oro MD    LORazepam (Ativan) injection, , intravenous, Code/trauma/sedation med, Evens Oro MD, 1 mg at 07/14/24 1313    oxygen (O2) therapy, , inhalation, Continuous PRN - O2/gases, Evens Oro MD, 4 L/min at 07/14/24 1251    tenecteplase (TNKASE) injection for STROKE  - Omnicell Override Pull, , , ,     Current Outpatient Medications:     ondansetron ODT (Zofran-ODT) 4 mg disintegrating tablet, Take 1 tablet (4 mg) by mouth every 8 hours if needed for nausea., Disp: 20 tablet, Rfl: 0    Surgical History  She has no past surgical history on file.     Social History  She reports that she has never smoked. She has never used smokeless tobacco. She reports that she does not currently use alcohol. She reports that she does not use drugs.    Family History  No family history on file.     Allergies  Patient has no known allergies.    Review of Systems     Last Recorded Vitals  /77   Pulse 97   Temp 36.2 °C (97.2 °F) (Temporal)   Resp 16   Wt 119 kg (262 lb 5.6 oz)   SpO2 99%     Physical Exam  GENERAL APPEARANCE:  No distress,  alert, interactive and cooperative.      MENTAL STATE:   Orientation was normal to time, place and person. Recent and remote memory was intact.  Attention span and concentration were normal. Language testing was normal for comprehension, repetition, expression, and naming. The patient could correctly interpret a picture. General fund of knowledge was intact.     OPHTHALMOSCOPIC:   The ophthalmoscopic exam was deferred.     CRANIAL NERVES:   CN 2   Visual fields full to confrontation.   CN 3, 4, 6   Pupils round, 4 mm in diameter, equally reactive to light. Lids symmetric; no ptosis. EOMs normal alignment, full range with normal saccades, pursuit and convergence.   No nystagmus.   CN 5   Facial sensation intact bilaterally.   CN 7   Normal and symmetric facial strength. Nasolabial folds symmetric.   CN 8   Hearing intact to conversation.  CN 9/10  Palate elevates symmetrically.   CN 11   Normal strength of shoulder shrug and neck turning.   CN 12   Tongue midline, with normal bulk and strength; no fasciculations.     MOTOR:   Muscle bulk and tone were normal in both upper and lower extremities.   No fasciculations, tremor or other abnormal movements were present.                         R          L  Deltoids        5          5  Biceps          5          5  Triceps          5          5  Wrist Flex      5          5  Wrist Ext       5          5    Hip Flex         5          5  Knee Flex      5          5  Knee Ext        5          5  Dorsiflex         5          5  Plantarflex      5          5    REFLEXES:                       R          L  BR:               2         2  Biceps:         2          2  Triceps:        2          2  Knee:            2          2  Ankle:          2          2    Babinski: toes downgoing to plantar stimulation. No clonus or other pathologic reflexes present.     SENSORY:   In both upper and lower extremities, sensation was intact to light touch    COORDINATION:    In both upper  extremities, finger-nose-finger was intact without dysmetria or overshoot.   In both lower extremities, heel-to-shin was intact.      GAIT:   Station was stable with a normal base. Gait was stable with a normal arm swing and speed. No ataxia, shuffling, steppage or waddling was present. No circumduction was present. Tandem gait was intact. No Romberg sign was present.      Relevant Results    [unfilled]      No echocardiogram results found for the past 12 months  No MRI head results found for the past 12 months  No CT head results found for the past 14 days      Assessment/Plan  Jennie Melendez is a 38 y.o. female with PMH here with

## 2024-07-15 NOTE — H&P
GENERAL HISTORY AND PHYSICAL  NOTE    History of Present Illness: Jennie Melendez is a 38 y.o.  female with no significant PMH  who presents for c/f first time seizure    Per patient, she has been having constant vertigo and dizziness that began gradually 3 weeks ago. Said that the symptoms were constant, non-positional, and occurs as soon as she wakes up. Nothing has helped relieve the sensations. She had seen her PCP was was scheduled to have an MRI tomorrow. Additionally, attempted the epley maneuver with no resolution in symptoms. The only change in her routine that she could recall was that she started weight lifting ~3 weeks prior. After that initial session then had several days of posterior neck pain that has since resolved.     Today, she woke up in the morning lethargic but was able to talk and ambulate to [employer]ism. At [employer]ism, she began to feel flushed, lightheaded, and dizzy. Additionally endorsed some slurred speech and confusion around this time as well. She then collapsed to the ground, did not lose consciousness. EMS was called and en route to the hospital she had 3 episodes of shaking. States that she recalls being in the ambulance when suddenly she had bilateral upper extremity shaking that progressed to LE shaking. Her eyes were open at the time and she remembers EMS saying that she was having a seizures. She had 3 episodes within the 15 minute ride to the OSH. States that during the last 5 minutes, she was unable to speak but was aware of her surroundings. At the OSH, a BAT was called as EMS noted L sided weakness, L FD, and R gaze deviation. NIHSS 13 w/L sided weakness. CTA was negative. She was thought to have efra's paralysis and received 2.4g Keppra bolus. Mental status and weakness improved within a couple of hours. WBC 5.8, no lactate drawn. Per pt had 2 additional episodes while in CT scan. Denies any changes in sleep habits, no alcohol/substance use, recent illness or stressors. No hx of  febrile seizures, birth or development complications, CNS infections, head trauma, CNS surgery, fam hx of epilepsy.    ROS: All systems reviewed and were negative except as above    Home Medications:    No medications    Past Medical History:    has no past medical history on file.    Past Surgical History:    has no past surgical history on file.    Allergies:   No Known Allergies    Family History:   No family history on file.    Past Social History:   Social History     Tobacco Use    Smoking status: Never    Smokeless tobacco: Never   Substance Use Topics    Alcohol use: Not Currently    Drug use: Never     Vitals:   Temp:  [36.2 °C (97.2 °F)] 36.2 °C (97.2 °F)  Heart Rate:  [75-97] 81  Resp:  [15-18] 16  BP: (103-128)/(65-84) 122/74  FiO2 (%):  [36 %] 36 %    Physical Exam:   Physical Exam  GENERAL APPEARANCE:  No distress, sitting up in bed     MENTAL STATE:   Orientation was normal to time, place and person. Recent and remote memory was intact.  Attention span and concentration were normal. General fund of knowledge was intact.      OPHTHALMOSCOPIC:   The ophthalmoscopic exam was deferred.      CRANIAL NERVES:   CN 2      Visual fields full to confrontation.   CN 3, 4, 6   Pupils round, 4 mm in diameter, equally reactive to light. Lids symmetric; no ptosis. Sustained R and L end gaze nystagmus. With upgaze, slow pendular nystagmus with some downbeats. Slow pendular nystagmus with downgaze.  CN 5   Facial sensation intact bilaterally.   CN 7   Normal and symmetric facial strength. Nasolabial folds symmetric.   CN 8   Hearing intact to conversation.  CN 9/10  Palate elevates symmetrically.   CN 11   Normal strength of shoulder shrug and neck turning.   CN 12   Tongue midline, with normal bulk and strength; no fasciculations.      MOTOR:   Muscle bulk and tone were normal in both upper and lower extremities.   No fasciculations, tremor or other abnormal movements were present.                          R           "L  Deltoids       5          5  Biceps          5          5  Triceps          5          5     Hip Flex         5         5  Knee Flex       5         5  Knee Ext         5         5  Dorsiflex         5         5  Plantarflex      5         5     REFLEXES:                       R          L  BR:               2          2  Biceps:         2          2  Triceps:        2          2  Knee:           3          3  Ankle:          2          2     Babinski: toes downgoing to plantar stimulation.      SENSORY:   In both upper and lower extremities, sensation was intact to light touch     COORDINATION:    In both upper extremities, finger-nose-finger was intact without dysmetria or overshoot.   In both lower extremities, heel-to-shin was intact.       GAIT:   Mildly ataxic, but difficult to assess as she was very unsteady, with narrow based gait requiring some support from 2 people.     HINTS negative   Slime hallpike - horizontal nystagmus when fixated on a point.     Labs:   Results from last 72 hours   Lab Units 07/14/24  1327   WBC AUTO x10*3/uL 5.8   HEMOGLOBIN g/dL 12.3   HEMATOCRIT % 38.9   PLATELETS AUTO x10*3/uL 218      Results from last 72 hours   Lab Units 07/14/24  1327   SODIUM mmol/L 136   POTASSIUM mmol/L 4.0   CHLORIDE mmol/L 103   CO2 mmol/L 22   BUN mg/dL 16   CREATININE mg/dL 0.98   GLUCOSE mg/dL 96      Results from last 72 hours   Lab Units 07/14/24  1327   ALK PHOS U/L 77   AST U/L 17   ALT U/L 11   BILIRUBIN TOTAL mg/dL 0.3               No results found for: \"BNP\"    Lab Results   Component Value Date    GLUCOSEU NEGATIVE 06/01/2024    BLOODU NEGATIVE 06/01/2024    PROTUR NEGATIVE 06/01/2024    NITRITEU NEGATIVE 06/01/2024    LEUKOCYTESU NEGATIVE 06/01/2024     Imaging:  [facility] 7/14 - negative    CTA HEAD FINDINGS:  Anterior circulation: The bilateral intracranial internal carotid  arteries, bilateral carotid terminals, bilateral proximal anterior  and middle cerebral arteries are normal.    "   Posterior circulation: Bilateral intracranial vertebral arteries,  vertebrobasilar junction, basilar artery and proximal posterior  cerebral arteries are normal.      CTA NECK FINDINGS:  Right carotid vessels: The common carotid artery is normal. The  carotid bifurcation is normal. The internal carotid artery in the  neck is normal. There is 0% stenosis  .      Left carotid vessels: The common carotid artery is normal. The  carotid bifurcation is normal. The internal carotid artery in the  neck is normal. There is 0% stenosis  .      Vertebral vessels:  The visualized segments of the cervical vertebral  arteries are normal in caliber.      IMPRESSION:  Normal cervical carotid/vertebral CT angiogram and normal cerebral CT  angiogram.  Assessment/Recommendations  Jennie Melendez is a 38 y.o. with no significant PMH who presents initially for c/f seizures w/subsequent Demetrius's paralysis, was also found to have 3 week history of dizziness and lightheadedness. Regarding the seizures, more likely non-epileptic given she recalls the event and the semiology (bilateral UE shaking that traveled down to bilateral LE shaking movements). Unlikely convulsive syncope as she had been laying down in the ambulance for several minutes prior to the events. Regarding her dizziness, PE was significant for both horizontal and vertical nystagmus, suggesting a central process. Ddx include an vascular event possibly localizing to the cerebellum (CTA was negative) vs demyelinating process vs a mass. Additionally, will rule out vertebral dissection given her history of weight lifting with neck pain that started soon after working out ~3 weeks ago, corresponding to when the dizziness began.     4D Classification of the Paroxysmal Episodes:  Non-epileptic   Semiology: B/L UE shaking -> B/L LE shaking  Epileptic Zone: Unknown  Frequency: 3 episodes in EMS, twice in CT scan per patient  Localizing Signs: None  Etiology: unknown   History of Status  Epilepticus: None  Co-morbidities: None  Previous AEDs: None    #C/f Seizures  #Vertigo  - Obtain MRI brain w/wo and MRI Brain w/fat suppression to r/o vertebral dissection   - Ordered routine EEG. Will hold off any additional ASM at this time     F: PRN  E: PRN  N: Regular  A: Access  Code: Full Code  NOK:   Jim Melendez (Spouse)  [phone] (Home Phone)      Tatyana Sifuentes MD  Neurology, PGY-2    PGY-4 Senior Addendum:    Jennie Melendez is a 38 year-old female with limited PMH who is presenting with 3 weeks of severe vertigo and nausea. She also had an event today that was initially concerning for a seizure. In regards to her vertigo, she endorses a persistent sensation of the environment spinning around her that has been unremitting since 3 weeks ago, with a relatively sudden onset at that time. She notes starting weight-lifting at that time and may have had some mild neck pain around the time of the onset, but none currently. She has associated double vision. She has tried the Epley maneuver with no relief. Her vertigo symptoms are constant and non-positional, with no improvement since onset. In regards to her event concerning for seizure, she actually describes a near syncopal event followed by nonspecific shaking in sequential fashion from her arms to legs with completely intact awareness.     On exam, she demonstrates signficant sustained, gaze-evoked nystagmus bilaterally as well as down-beat nystagmus on up gaze with peculiar possible pendular nystagmus with both upward and down-lewis gaze. She has an ataxic gait. Her exam is concerning for central vertigo, possibly localizing to the cerebellum vs less likely brainstem. The differential should include demyelinating disease, vascular insult, and mass. Her event c/f appears more likely a comorbid non epileptic event. Will plan for MRI brain with and without contrast and a routine EEG. Rest of plan per excellent note detailed above.    Joaquim Johnson, Neurology  PGY-4

## 2024-07-15 NOTE — CARE PLAN
The patient's goals for the shift include PT/OT, ambulating halls, EEG and MRI orders.      Problem: Pain  Goal: Takes deep breaths with improved pain control throughout the shift  Outcome: Progressing  Goal: Turns in bed with improved pain control throughout the shift  Outcome: Progressing  Goal: Walks with improved pain control throughout the shift  Outcome: Progressing  Goal: Performs ADL's with improved pain control throughout shift  Outcome: Progressing  Goal: Participates in PT with improved pain control throughout the shift  Outcome: Progressing  Goal: Free from opioid side effects throughout the shift  Outcome: Progressing  Goal: Free from acute confusion related to pain meds throughout the shift  Outcome: Progressing

## 2024-07-16 ENCOUNTER — APPOINTMENT (OUTPATIENT)
Dept: RADIOLOGY | Facility: HOSPITAL | Age: 39
DRG: 880 | End: 2024-07-16
Payer: COMMERCIAL

## 2024-07-16 LAB
ALBUMIN SERPL BCP-MCNC: 3.9 G/DL (ref 3.4–5)
ANION GAP SERPL CALC-SCNC: 11 MMOL/L (ref 10–20)
BUN SERPL-MCNC: 13 MG/DL (ref 6–23)
CALCIUM SERPL-MCNC: 9.1 MG/DL (ref 8.6–10.6)
CHLORIDE SERPL-SCNC: 107 MMOL/L (ref 98–107)
CO2 SERPL-SCNC: 24 MMOL/L (ref 21–32)
CREAT SERPL-MCNC: 0.84 MG/DL (ref 0.5–1.05)
EGFRCR SERPLBLD CKD-EPI 2021: >90 ML/MIN/1.73M*2
ERYTHROCYTE [DISTWIDTH] IN BLOOD BY AUTOMATED COUNT: 13.6 % (ref 11.5–14.5)
GLUCOSE BLD MANUAL STRIP-MCNC: 161 MG/DL (ref 74–99)
GLUCOSE BLD MANUAL STRIP-MCNC: 183 MG/DL (ref 74–99)
GLUCOSE BLD MANUAL STRIP-MCNC: 202 MG/DL (ref 74–99)
GLUCOSE BLD MANUAL STRIP-MCNC: 92 MG/DL (ref 74–99)
GLUCOSE SERPL-MCNC: 89 MG/DL (ref 74–99)
HCT VFR BLD AUTO: 41.2 % (ref 36–46)
HGB BLD-MCNC: 13 G/DL (ref 12–16)
MAGNESIUM SERPL-MCNC: 2.2 MG/DL (ref 1.6–2.4)
MCH RBC QN AUTO: 29 PG (ref 26–34)
MCHC RBC AUTO-ENTMCNC: 31.6 G/DL (ref 32–36)
MCV RBC AUTO: 92 FL (ref 80–100)
NRBC BLD-RTO: 0 /100 WBCS (ref 0–0)
PHOSPHATE SERPL-MCNC: 3.1 MG/DL (ref 2.5–4.9)
PLATELET # BLD AUTO: 223 X10*3/UL (ref 150–450)
POTASSIUM SERPL-SCNC: 4.3 MMOL/L (ref 3.5–5.3)
RBC # BLD AUTO: 4.49 X10*6/UL (ref 4–5.2)
RHEUMATOID FACT SER NEPH-ACNC: <10 IU/ML (ref 0–15)
SODIUM SERPL-SCNC: 138 MMOL/L (ref 136–145)
WBC # BLD AUTO: 5.8 X10*3/UL (ref 4.4–11.3)

## 2024-07-16 PROCEDURE — 84100 ASSAY OF PHOSPHORUS: CPT

## 2024-07-16 PROCEDURE — 1200000002 HC GENERAL ROOM WITH TELEMETRY DAILY

## 2024-07-16 PROCEDURE — 86038 ANTINUCLEAR ANTIBODIES: CPT

## 2024-07-16 PROCEDURE — 70553 MRI BRAIN STEM W/O & W/DYE: CPT

## 2024-07-16 PROCEDURE — 2500000004 HC RX 250 GENERAL PHARMACY W/ HCPCS (ALT 636 FOR OP/ED)

## 2024-07-16 PROCEDURE — 36415 COLL VENOUS BLD VENIPUNCTURE: CPT

## 2024-07-16 PROCEDURE — 72156 MRI NECK SPINE W/O & W/DYE: CPT | Performed by: RADIOLOGY

## 2024-07-16 PROCEDURE — 2500000001 HC RX 250 WO HCPCS SELF ADMINISTERED DRUGS (ALT 637 FOR MEDICARE OP)

## 2024-07-16 PROCEDURE — 86431 RHEUMATOID FACTOR QUANT: CPT

## 2024-07-16 PROCEDURE — 2550000001 HC RX 255 CONTRASTS: Performed by: STUDENT IN AN ORGANIZED HEALTH CARE EDUCATION/TRAINING PROGRAM

## 2024-07-16 PROCEDURE — 72157 MRI CHEST SPINE W/O & W/DYE: CPT

## 2024-07-16 PROCEDURE — 85027 COMPLETE CBC AUTOMATED: CPT

## 2024-07-16 PROCEDURE — 83735 ASSAY OF MAGNESIUM: CPT

## 2024-07-16 PROCEDURE — 72157 MRI CHEST SPINE W/O & W/DYE: CPT | Performed by: RADIOLOGY

## 2024-07-16 PROCEDURE — 82947 ASSAY GLUCOSE BLOOD QUANT: CPT

## 2024-07-16 PROCEDURE — 99232 SBSQ HOSP IP/OBS MODERATE 35: CPT

## 2024-07-16 PROCEDURE — 70543 MRI ORBT/FAC/NCK W/O &W/DYE: CPT | Performed by: RADIOLOGY

## 2024-07-16 PROCEDURE — 2500000002 HC RX 250 W HCPCS SELF ADMINISTERED DRUGS (ALT 637 FOR MEDICARE OP, ALT 636 FOR OP/ED)

## 2024-07-16 PROCEDURE — 72156 MRI NECK SPINE W/O & W/DYE: CPT

## 2024-07-16 PROCEDURE — 97165 OT EVAL LOW COMPLEX 30 MIN: CPT | Mod: GO

## 2024-07-16 PROCEDURE — 70543 MRI ORBT/FAC/NCK W/O &W/DYE: CPT

## 2024-07-16 PROCEDURE — 2500000004 HC RX 250 GENERAL PHARMACY W/ HCPCS (ALT 636 FOR OP/ED): Performed by: STUDENT IN AN ORGANIZED HEALTH CARE EDUCATION/TRAINING PROGRAM

## 2024-07-16 PROCEDURE — A9575 INJ GADOTERATE MEGLUMI 0.1ML: HCPCS | Performed by: STUDENT IN AN ORGANIZED HEALTH CARE EDUCATION/TRAINING PROGRAM

## 2024-07-16 RX ORDER — IBUPROFEN 400 MG/1
400 TABLET ORAL ONCE
Status: COMPLETED | OUTPATIENT
Start: 2024-07-16 | End: 2024-07-16

## 2024-07-16 RX ORDER — GADOTERATE MEGLUMINE 376.9 MG/ML
23 INJECTION INTRAVENOUS
Status: COMPLETED | OUTPATIENT
Start: 2024-07-16 | End: 2024-07-16

## 2024-07-16 RX ORDER — INSULIN LISPRO 100 [IU]/ML
0-5 INJECTION, SOLUTION INTRAVENOUS; SUBCUTANEOUS
Status: DISCONTINUED | OUTPATIENT
Start: 2024-07-16 | End: 2024-07-18

## 2024-07-16 RX ORDER — DEXTROSE 50 % IN WATER (D50W) INTRAVENOUS SYRINGE
25
Status: DISCONTINUED | OUTPATIENT
Start: 2024-07-16 | End: 2024-07-19 | Stop reason: HOSPADM

## 2024-07-16 RX ORDER — DEXTROSE 50 % IN WATER (D50W) INTRAVENOUS SYRINGE
12.5
Status: DISCONTINUED | OUTPATIENT
Start: 2024-07-16 | End: 2024-07-19 | Stop reason: HOSPADM

## 2024-07-16 ASSESSMENT — ACTIVITIES OF DAILY LIVING (ADL)
ADL_ASSISTANCE: INDEPENDENT
BATHING_ASSISTANCE: INDEPENDENT

## 2024-07-16 ASSESSMENT — PAIN SCALES - WONG BAKER: WONGBAKER_NUMERICALRESPONSE: HURTS LITTLE MORE

## 2024-07-16 ASSESSMENT — COGNITIVE AND FUNCTIONAL STATUS - GENERAL
DAILY ACTIVITIY SCORE: 24
WALKING IN HOSPITAL ROOM: A LITTLE
CLIMB 3 TO 5 STEPS WITH RAILING: A LITTLE
DAILY ACTIVITIY SCORE: 22
MOBILITY SCORE: 22
HELP NEEDED FOR BATHING: A LITTLE
DRESSING REGULAR LOWER BODY CLOTHING: A LITTLE

## 2024-07-16 ASSESSMENT — PAIN SCALES - GENERAL
PAINLEVEL_OUTOF10: 0 - NO PAIN
PAINLEVEL_OUTOF10: 5 - MODERATE PAIN
PAINLEVEL_OUTOF10: 0 - NO PAIN

## 2024-07-16 ASSESSMENT — PAIN - FUNCTIONAL ASSESSMENT
PAIN_FUNCTIONAL_ASSESSMENT: 0-10
PAIN_FUNCTIONAL_ASSESSMENT: 0-10

## 2024-07-16 ASSESSMENT — PAIN DESCRIPTION - LOCATION: LOCATION: HEAD

## 2024-07-16 NOTE — SIGNIFICANT EVENT
"Around 1230pm patient had an episode of nonresponsiveness and shaking witnessed by nursing and the patient's .      Ms. Melendez states she was walking the halls with PT when she started feeling hot on her right occiput and face. She then returned to her room and laid in the hospital bed. She states the sensation of heat continued to increase and spread to include her whole face/head. At this time she told her nurse that she felt she was going to have another seizure similar to initial episode on 7/14 (singing in [employer] -> R occipital headache -> sensation of heat -> syncopal event with brief LOC -> cold/tingling feet -> generalized BUE and LUE shaking with intact consciousness). She then became tachypneic with heavy breathing and states she felt that she lost control of her eyes and she could not speak. Per nursing and her , the patient had upward gaze deviation and staring with eyes open. She also had slight side-to-side bobbing of her head. During this time she was able to follow commands and squeeze both nurse's and 's hands. This episode lasted < 2 min.    She then regained ability to speak. Vitals at this time were notable for hypertension to approx 156/106.  The patient states her feet felt cold and tingly at this time.    Several minutes later the patient had another episode of tachypnea followed by inability to speak with side-to-side nystagmoid eye movements, head flexion/extension, and generalized body shaking lasting < 2 min. The patient states she remembers the event and remembers being told to remember the word \"apple\". Following this event the patient was lethargic and drowsy, blood pressure was 140/90. Immediately after the event she endorsed some decreased sensation in LLE that resolved over several minutes. The patient did not experience incontinence or tongue bite with either episode.     Neurological exam:  Gen: laying in bed, alert and oriented, soft-spoken with slow " responses  CN:  III/IV/VI: EOM intact to smooth pursuit with end-gaze nystagmus on left and right gaze, upbeating nystagmus on up-gaze, ?exotropia of L eye on cover-uncover  VII: symmetric nasolabial folds  VIII: hearing intact to conversation  Motor:  No pronator drift  5/5 strength bilateral biceps/triceps  5/5 strength bilateral hip flexion/knee flexion/knee extension  Sensation:  Intact to light touch symmetrically in bilateral lower and upper extremities    Assessment:  Today's episode is similar to patient's prior episode on 7/14. Occurrence of both episodes following physical activity (singing in [employer], walking with PT) is c/w syncope vs vasovagal event. Will start tele to check for intermittent arrhythmia and will also check orthostatic vitals. While routine EEG on 7/15 was normal, this does not rule-out epileptic episodes. Semiology (bilateral shaking with maintained consciousness) is most c/w non-epileptic episode but will start cvEEG to capture any future events.    4D Classification of the Paroxysmal Episodes:  Non-epileptic   Semiology: autonomic -> nystagmoid -> generalized shaking  Epileptic Zone: Unknown  Frequency: 3 episodes in EMS 7/14, twice in 7/14 CT scan per patient, twice on 7/15 after walking with PT  Localizing Signs: None  Etiology: unknown   History of Status Epilepticus: None  Co-morbidities: None  Previous AEDs: None    Updates to plan:  - start telemetry   - orthostatic vitals  - start cvEEG after MRI    Plan discussed with Dr. Haywood who is in agreement.

## 2024-07-16 NOTE — PROGRESS NOTES
Occupational Therapy    Evaluation    Patient Name: Jennie Melendez  MRN: [id number]  Today's Date: 7/16/2024  Time Calculation  Start Time: 1016  Stop Time: 1031  Time Calculation (min): 15 min  OT Student under direct supervision of OTR/L  Assessment  IP OT Assessment  OT Assessment: Patient is independent-supervision for all ADLs and functional mobility, at this time no acute OT needs were identified  Prognosis: Excellent  Barriers to Discharge: None  Evaluation/Treatment Tolerance: Patient tolerated treatment well  Medical Staff Made Aware: Yes  End of Session Communication: Bedside nurse  End of Session Patient Position: Alarm off, caregiver present (Sitting EOB)  Plan:  No Skilled OT: No acute OT goals identified  OT Frequency: OT eval only  OT Discharge Recommendations: No further acute OT, No OT needed after discharge  OT Recommended Transfer Status: Independent  OT - OK to Discharge: Yes    Subjective   General:  General  Reason for Referral: presenting with Seizures (Multi).  Past Medical History Relevant to Rehab: no significant PMHx  Family/Caregiver Present: Yes  Caregiver Feedback:  present for session  Prior to Session Communication: Bedside nurse  Patient Position Received: Bed, 3 rail up, Alarm off, not on at start of session  General Comment: Patient agreeable to OT evaluation , upon arrival patient attached to tele and IV  Precautions:  Hearing/Visual Limitations: hearing WFL  Medical Precautions: Fall precautions  Vital Signs:     Pain:  Pain Assessment  Pain Assessment: 0-10  0-10 (Numeric) Pain Score: 0 - No pain    Objective   Cognition:  Overall Cognitive Status: Within Functional Limits  Arousal/Alertness: Appropriate responses to stimuli  Orientation Level: Oriented X4  Following Commands: Follows all commands and directions without difficulty        Deal Agitation Sedation Scale  Deal Agitation Sedation Scale (RASS): Alert and calm  Home Living:  Type of Home: House  Lives  With: Spouse, Dependent children (5 children under the age of 10)  Home Adaptive Equipment: None  Home Layout: One level, Work area in basement  Home Access: Stairs to enter with rails  Entrance Stairs-Number of Steps: 2 COLEMAN  Bathroom Shower/Tub: Walk-in shower, Tub/shower unit (walk-in shower in primary bathroom)   Prior Function:  Level of Crane: Independent with ADLs and functional transfers, Independent with homemaking with ambulation  Receives Help From: Family  ADL Assistance: Independent  Homemaking Assistance: Independent  Ambulatory Assistance: Independent  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)  Leisure: spending time outside  Hand Dominance: Right  Prior Function Comments: +drive, -falls    ADL:  Eating Assistance: Independent  Eating Deficit: Setup  Grooming Assistance: Independent  Grooming Deficit:  (anticipated)  Bathing Assistance: Independent  Bathing Deficit:  (anticipated)  UE Dressing Assistance: Independent  UE Dressing Deficit:  (anticipated)  LE Dressing Assistance: Independent  LE Dressing Deficit: Thread RLE into pants, Thread LLE into pants  Toileting Assistance with Device: Independent  Toileting Deficit:  (anticipated)  Activity Tolerance:  Early Mobility/Exercise Safety Screen: Proceed with mobilization - No exclusion criteria met  Bed Mobility/Transfers: Bed Mobility  Bed Mobility: Yes  Bed Mobility 1  Bed Mobility 1: Supine to sitting  Level of Assistance 1: Independent    Transfers  Transfer: Yes  Transfer 1  Transfer From 1: Sit to, Stand to  Transfer to 1: Stand, Sit  Technique 1: Sit to stand, Stand to sit  Transfer Level of Assistance 1: Independent    Functional Mobility:  Functional Mobility  Functional Mobility Performed: Yes  Functional Mobility 1  Comments 1: patient ambulated to bathroom within her room with supervision, patient had no LOB or complaints of dizziness  Sitting Balance:  Static Sitting Balance  Static Sitting-Level of Assistance:  Independent  Dynamic Sitting Balance  Dynamic Sitting-Comments: Independent  Standing Balance:  Static Standing Balance  Static Standing-Level of Assistance: Independent  Dynamic Standing Balance  Dynamic Standing-Comments: Supervision    Vision: Vision - Basic Assessment  Current Vision: Wears contacts  Sensation:  Sensation Comment: patient denies any numbness or tingling  Strength:  Strength Comments: (B)  >=3+/5, (B) elbows >=3+/5, (B) shoulders >=3+/5     Hand Function:  Hand Function  Gross Grasp: Functional  Coordination: Functional  Extremities: RUE   RUE : Within Functional Limits and LUE   LUE: Within Functional Limits    Outcome Measures: [facility] Daily Activity  Putting on and taking off regular lower body clothing: None  Bathing (including washing, rinsing, drying): None  Putting on and taking off regular upper body clothing: None  Toileting, which includes using toilet, bedpan or urinal: None  Taking care of personal grooming such as brushing teeth: None  Eating Meals: None  Daily Activity - Total Score: 24    , E = Exercise and Early Mobility  Early Mobility/Exercise Safety Screen: Proceed with mobilization - No exclusion criteria met, and OT Adult Other Outcome Measures  4AT: Negative  Education Documentation  Body Mechanics, taught by LIU Cole at 7/16/2024 11:33 AM.  Learner: Significant Other, Patient  Readiness: Acceptance  Method: Explanation  Response: Verbalizes Understanding    Precautions, taught by LIU Cole at 7/16/2024 11:33 AM.  Learner: Significant Other, Patient  Readiness: Acceptance  Method: Explanation  Response: Verbalizes Understanding    ADL Training, taught by LIU Cole at 7/16/2024 11:33 AM.  Learner: Significant Other, Patient  Readiness: Acceptance  Method: Explanation  Response: Verbalizes Understanding    Education Comments  No comments found.    Frances MONDRAGON

## 2024-07-16 NOTE — HOSPITAL COURSE
Ms. Jennie Melendez is a 39yo woman with no significant PMHx who presented to  on 7/14 with c/f first-time seizure. 3 weeks prior to presentation the patient began experiencing constant vertigo and dizziness. Then on 7/14 the patient had sudden-onset flushing, lightheadedness, and dizziness followed by collapse with brief LOC (no post-ictal confusion/lethargy) that was then followed by generalized BUE shaking that progressed to BLE shaking with intact consciousness. At OSH the patient was noted to have R gaze deviation and L sided weakness c/f Demetrius's paralysis so she received 2x 1 mg ativan and was loaded with 2400 mg Keppra. The weakness improved over several hours. Routine EEG (done 12+ hours after the episode) was normal, no epileptiform discharges or lateralizing signs were seen. Given EEG wnl and semiology most c/w non-epileptic event, the patient was not started on AEDs.    On exam the patient was found to have sustained end-gaze nystagmus on R and L gaze, as well as upbeat nystagmus on up gaze. She also had 3+ patellar reflexes bilaterally with downgoing toes on plantar stimulation.     MRIb showed scattered T2/FLAIR white matter hyperintensities with several periventricular lesions and few enhancing foci in the L posterior parietal and occipital lobes. MRI C-/T-spine showed a contrast enhancing intramedullary T2 hyperintensity at T1-2 level, as well as questionable focus of T2 signal at C3-C4 without enhancement, and multilevel spondylosis at C3-C6 and T6-10. MRI orbits was unremarkable.    Based on MRI findings, patient met Polk criteria for a new diagnosis of multiple sclerosis and was treated with 1g IVMP Q18H x5 doses (7/16-7/19). Patient has significant family hx of autoimmune disease (AMAURI and lupus in sister, lupus in mother), so RF and ANETA were checked (both negative). Ophthalmology was consulted, who recommended thiamine 100 mg PO TID for 2-3 days.      On 7/16 the patient had another episode of  autonomic -> nystagmoid -> generalized tremulous shaking that was precipitated by walking with PT. She had intact consciousness, could squeeze hands/follow commands but not speak during the event. She had urinary incontinence and post-event lethargy. Event semiology was thought to be non-epileptic, possibly representing cardiogenic syncope vs vasovagal. Orthostatic vitals were normal (140/87 P82 lying down -> 135/87 P86 standing). Telemetry was started with no arrhythmia seen over 3 days. vEEG was obtained for 24 hours with no further events captured. The patient did have an episode of autonomic symptoms (flushing, sinus tachycardia, tachypnea, and hypertension) with no EEG correlate. This episode did not progress to a staring spell, nystagmoid eye movements, loss of ability to speak, or generalized shaking. Since patient's EEG, telemetry, and orthostatic vitals were normal, it was thought that the patient's episodes were likely panic attacks. The patient was provided with a prescription of 25 mg sertraline daily (with instructions to increase to 50 mg daily after 1 week) and advised to follow-up outpatient with either PCP or psychiatrist within 2 months. She was also advised to consider cognitive behavioral therapy.    She was seen by PT/OT and had no homegoing needs. She was discharged home on 7/19 after receiving fifth dose of IVMP with improvement in her nystagmus and vertigo.     Referral for follow-up with neuro-immunology was placed and pre-immunotherapy labs were sent. Lipid panel (total cholesterol 184, HDL 58.6, LDL 97, ) and 25-OH vitamin D were normal. Sars-Cov-2 nucleocapsid IgG, HBV Core Ab, HBV Surface Ag, HBV Surface Ab, HCV RNA, HCV Ab, syphilis screen, and HIV were all negative. VZV IgG was elevated. IgG and IgA were normal, but IgM was mildly elevated at 242.    On day of discharge, the patient developed a new sore throat without fever, cough, or SOB. Covid PCR and respiratory virus panels  were sent. The patient was advised to follow-up with her PCP if her test results were positive or her symptoms did not resolve.    Outpatient referrals for follow-up were made with neuro-immunology and ophthalmology. Patient was advised to follow-up with PCP and psychiatrist within 2 months to discuss adjustment of sertraline dose.    Pending labs at time of discharge  - JCV antibody   - Tspot  - B cell phenotyping  - Vitamin D 1,25-dihydroxy  - Vitamin B1  - Covid PCR  - Respiratory virus panel    New medications at discharge:  - Thiamine (vitamin B1) 100 mg three times daily (last dose Saturday night)  - Vitamin D 50,000IU once per week  - Sertraline (zoloft) 25 mg daily for 1 week, then increase to 50 mg daily  - Pantoprazole (protonix) 40 mg daily while taking prednisone (7/20 - 8/30)  - Bactrim every Monday, Wednesday, and Friday while taking prednisone (7/20 - 8/30)  6 week prednisone taper:  - 60 mg daily for 1 week (7/20 - 7/26)  - 50 mg daily for 1 week (7/27 - 8/2)  - 40 mg daily for 1 week (8/3 - 8/9)  - 30 mg daily for 1 week (8/10 - 8/16)  - 20 mg daily for 1 week (8/17 - 8/23)  - 10 mg daily for 1 week (8/24-8/30)

## 2024-07-16 NOTE — PROGRESS NOTES
"Jennie Melendez is a 38 y.o. female on day 2 of admission presenting with Seizures (Multi).    Subjective     NAEON.  Patient denies any episodes of generalized shaking or LOC overnight.  Endorses one episode of R-sided temporal/behind the eye headache at 10pm, improved with sleep.  Denies constipation.         Objective     Physical Exam   Gen: NAD, resting comfortably in bed  Resp: good air exchange bilaterally, normal respiratory effort on RA  CV: RRR, no murmurs/gallops/rubs  Skin: Warm, dry, no rashes on visualized skin  Psych: appropriate mood and affect      Neuro:  CN:  II: Visual fields full to finger count, visual acuity with contacts 20/25 -1 on left, 20/20 -2 on right  II/III: PERRL  III/IV/VI: EOM intact to smooth pursuit with end-gaze nystagmus on both far left and far right gaze, upbeating nystagmus on up-gaze  V: facial sensation intact to light touch  VII: normal and symmetric facial strength, symmetric nasolabial folds  VIII: hearing intact to conversation  IX/X: palate elevates symmetrically  XI: 5/5 strength bilateral SCM and trapezius  XII: Tongue midline  Motor:  5/5 strength in bilateral upper and lower extremities  Triceps/biceps/brachioradialis 2+ bilaterally  Patellar reflexes 3+ bilaterally with positive suprapatellar  Ankle reflexes 2+ bilaterally, no clonus    Last Recorded Vitals  Blood pressure 106/69, pulse 64, temperature 36.4 °C (97.5 °F), temperature source Temporal, resp. rate 17, height 1.626 m (5' 4\"), weight 119 kg (262 lb), last menstrual period 06/30/2024, SpO2 98%.  Intake/Output last 3 Shifts:  No intake/output data recorded.    Medications   Scheduled medications   enoxaparin, 40 mg, subcutaneous, q12h EVERETT  methylPREDNISolone sodium succinate (PF), 1,000 mg, intravenous, q24h      PRN medications   PRN medications: acetaminophen **OR** acetaminophen, LORazepam, ondansetron     Recent labs  WBC   Date Value Ref Range Status   07/15/2024 5.2 4.4 - 11.3 x10*3/uL Final "   07/14/2024 5.8 4.4 - 11.3 x10*3/uL Final   06/04/2024 5.6 4.4 - 11.3 x10*3/uL Final     Hemoglobin   Date Value Ref Range Status   07/15/2024 12.8 12.0 - 16.0 g/dL Final   07/14/2024 12.3 12.0 - 16.0 g/dL Final   06/04/2024 12.5 12.0 - 16.0 g/dL Final     Platelets   Date Value Ref Range Status   07/15/2024 235 150 - 450 x10*3/uL Final   07/14/2024 218 150 - 450 x10*3/uL Final   06/04/2024 182 150 - 450 x10*3/uL Final     Glucose   Date Value Ref Range Status   07/15/2024 77 74 - 99 mg/dL Final   07/14/2024 96 74 - 99 mg/dL Final   06/04/2024 92 74 - 99 mg/dL Final     Sodium   Date Value Ref Range Status   07/15/2024 139 136 - 145 mmol/L Final   07/14/2024 136 136 - 145 mmol/L Final   06/04/2024 140 136 - 145 mmol/L Final     Potassium   Date Value Ref Range Status   07/15/2024 4.4 3.5 - 5.3 mmol/L Final   07/14/2024 4.0 3.5 - 5.3 mmol/L Final   06/04/2024 3.5 3.5 - 5.3 mmol/L Final     Chloride   Date Value Ref Range Status   07/15/2024 106 98 - 107 mmol/L Final   07/14/2024 103 98 - 107 mmol/L Final   06/04/2024 108 (H) 98 - 107 mmol/L Final     Urea Nitrogen   Date Value Ref Range Status   07/15/2024 12 6 - 23 mg/dL Final   07/14/2024 16 6 - 23 mg/dL Final   06/04/2024 15 6 - 23 mg/dL Final     Creatinine   Date Value Ref Range Status   07/15/2024 0.96 0.50 - 1.05 mg/dL Final   07/14/2024 0.98 0.50 - 1.05 mg/dL Final   06/04/2024 0.81 0.50 - 1.05 mg/dL Final     Magnesium   Date Value Ref Range Status   07/15/2024 2.28 1.60 - 2.40 mg/dL Final   06/01/2024 1.94 1.60 - 2.40 mg/dL Final     Calcium   Date Value Ref Range Status   07/15/2024 9.3 8.6 - 10.6 mg/dL Final   07/14/2024 9.5 8.6 - 10.3 mg/dL Final   06/04/2024 8.8 8.6 - 10.3 mg/dL Final     Albumin   Date Value Ref Range Status   07/14/2024 4.4 3.4 - 5.0 g/dL Final   06/01/2024 4.6 3.4 - 5.0 g/dL Final         Imaging   EEG    Result Date: 7/15/2024  IMPRESSION Impression This routine EEG is normal. No epileptiform discharges or lateralizing signs were  seen. A full report will be scanned into the patient's chart at a later time. This report has been interpreted and electronically signed by    ECG 12 lead    Result Date: 7/15/2024  Normal sinus rhythm Normal ECG When compared with ECG of 08-APR-2020 22:45, Previous ECG has undetermined rhythm, needs review See ED provider note for full interpretation and clinical correlation Confirmed by Ailyn Velázquez ([id number]) on 7/15/2024 11:44:09 AM    MR brain w and wo IV contrast    Result Date: 7/15/2024  Interpreted By:  Linda Capps and Baker Zachary STUDY: MR BRAIN W AND WO IV CONTRAST;  7/15/2024 2:16 am   INDICATION: Signs/Symptoms:Sustained multidirectional nsytagmus, gait ataxia.   COMPARISON: CT head and CTA head and neck on 07/14/2024.   ACCESSION NUMBER(S): [id number]   ORDERING CLINICIAN: AASEF SHAIKH   TECHNIQUE: Axial T2, FLAIR, DWI, gradient echo T2 and sagittal and coronal T1 weighted images of brain were acquired. Post contrast T1 weighted images were acquired after administration of 20 mL Dotarem gadolinium based intravenous contrast.   FINDINGS: CSF Spaces: The ventricles, sulci and basal cisterns are within normal limits.   Parenchyma: There is a punctate focus of DWI bright signal within the right occipital lobe with isointense signal on ADC and hyperintense signal on T2 and FLAIR. Other foci of patchy T2 and FLAIR hyperintense signal are noted within left periatrial white matter and bilateral occipital lobes without associated diffusion restriction. Few of these foci within the left posterior parietal lobe and occipital lobes demonstrate enhancement (series 15, image images 99, 100, 108, 143, and 146).   There are few other scattered foci of T2 and FLAIR hyperintense signal within bilateral periventricular white matter, a few of these foci appear perpendicular to the ventricular margins.   Paranasal Sinuses and Mastoids: Visualized paranasal sinuses and mastoid air cells are unremarkable.        Scattered white matter changes within bilateral periventricular and subcortical white matter. At least 1 focus demonstrates bright signal on DWI with isointense signal on ADC and few foci demonstrate enhancement. Differential considerations include sequela of a demyelinating process such as multiple sclerosis, scattered foci of late subacute or chronic infarcts or small vessel ischemic changes, sequela of chronic hypertension, vasculitis, among other causes.   I personally reviewed the images/study and I agree with the findings as stated by Dr. Chuck Cobian M.D. This study was interpreted at [place], [place].   MACRO: None   Signed by: Linda Capps 7/15/2024 8:17 AM Dictation workstation:   [id number]    CT brain attack angio head and neck W and WO IV contrast    Result Date: 7/14/2024  Interpreted By:  Fito Hloly, STUDY: CT BRAIN ATTACK ANGIO HEAD AND NECK W AND WO IV CONTRAST;  7/14/2024 1:39 pm   INDICATION: Signs/Symptoms:LEFT HEMIPAPRESIS.   COMPARISON: None.   ACCESSION NUMBER(S): [id number]   ORDERING CLINICIAN: CARMEN MCDANIELS   TECHNIQUE: 69 mL Omnipaque 350 were administered intravenously and axial images of the head and neck were acquired.  Coronal, sagittal, and 3-D reconstructions were provided for review. Carotid stenoses were determined using modified NASCET criteria.   FINDINGS:     CTA HEAD FINDINGS:   Anterior circulation: The bilateral intracranial internal carotid arteries, bilateral carotid terminals, bilateral proximal anterior and middle cerebral arteries are normal.   Posterior circulation: Bilateral intracranial vertebral arteries, vertebrobasilar junction, basilar artery and proximal posterior cerebral arteries are normal.   CTA NECK FINDINGS:   Right carotid vessels: The common carotid artery is normal. The carotid bifurcation is normal. The internal carotid artery in the neck is normal. There is 0% stenosis  .   Left carotid vessels:  The common carotid artery is normal. The carotid bifurcation is normal. The internal carotid artery in the neck is normal. There is 0% stenosis  .   Vertebral vessels:  The visualized segments of the cervical vertebral arteries are normal in caliber.         Normal cervical carotid/vertebral CT angiogram and normal cerebral CT angiogram.   MACRO: None   Signed by: Fito Holly 7/14/2024 2:03 PM Dictation workstation:   [id number]    CT brain attack head wo IV contrast    Result Date: 7/14/2024  Interpreted By:  Carlos Kwok, STUDY: CT BRAIN ATTACK HEAD WO IV CONTRAST;  7/14/2024 1:14 pm   INDICATION: Signs/Symptoms:seizure.   COMPARISON: No prior examination available for comparison. If a prior examination becomes available, this examination will be compared to the prior study. Addendum report will be issued.   ACCESSION NUMBER(S): [id number]   ORDERING CLINICIAN: CARMEN MCDANIELS   TECHNIQUE: Noncontrast axial CT scan of head was performed. Angled reformats in brain and bone windows were generated. The images were reviewed in bone, brain, blood and soft tissue windows.   FINDINGS: Less than optimal imaging technique. No acute hemorrhage or edema. No abnormal extra-axial fluid collections. Diffuse loss of the gray-white differentiation is likely due to technique unlike the result of global edema. Does this patient have clinical findings of global cerebral edema? Please refer to the CT angiogram report for additional details.   Orbits and paranasal sinuses are clear. No skull fracture.       Less than optimal imaging technique. No acute hemorrhage or edema.   MACRO: Carlos Kwok discussed the significance and urgency of this critical finding by epic message with  CARMEN MCDANIELS on 7/14/2024 at 1:34 pm.  (**-RCF-**) Findings:  See findings.     Signed by: Carlos Kwok 7/14/2024 1:36 PM Dictation workstation:   [id number]               Assessment/Plan   Principal Problem:    Seizures (Multi)    Ms. Melendez is a  37yo woman with no significant PMHx who presented to  on 7/14 with c/f first-time seizure. 3 weeks prior to presentation the patient began experiencing constant vertigo and dizziness. Then on 7/14 the patient had sudden-onset flushing, lightheadedness, and dizziness followed by collapse with brief LOC (no post-ictal confusion/lethargy) that was then followed by generalized BUE shaking that progressed to BLE shaking. At OSH the patient was noted to have R gaze deviation and L sided weakness c/f Demetrius's paralysis so she received 2x 1 mg ativan and was loaded with 2400 mg Keppra. The weakness improved over several hours. However, given the semiology and patient's intact consciousness during these events, these episodes were likely nonepileptic.     MRIb with few punctate T2/FLAIR white matter hyperintensities with few enhancing foci in the L posterior parietal and occipital lobes. These MRIb findings could be consistent with a possible new diagnosis of demyelinating disease, but no lesions in brainstem/cerebellum were seen to explain nystagmus. Will obtain MRIb thin cuts and MRI C/T-spine. Plan to start IVMP today.     #vertigo with horizontal and vertical nystagmus  #scattered T2/FLAIR white matter hyperintensities with enhancing foci in L posterior parietal and occipital lobes  #c/f demylinating disease vs infarct  ::famhx lupus in mother and sister  ::Utox negative  [ ] obtain MRIb thin cuts w/wo for improved visualization of brainstem structures  [ ] obtain MRI orbit w/wo  [ ] obtain MRI C/T-spine w/wo  [ ] check RF  [ ] check ANETA with reflex  [ ] start 1g IVMP q18h x5 doses with SSI  [ ] ophthalmology consulted  [ ] will consider LP if diagnosis remains unclear after obtaining MRI     #non-epileptic generalized shaking vs epileptic event  :: s/p 2400 mg keppra load  :: semiology inconsistent with an epileptic event (syncope -> BUE generalized shaking -> BLE generalized shaking with intact consciousness)   :: 7/15  routine EEG nl  - no current need for AEDs     F: PRN  E: PRN  N: Regular  A: 2x pIV  DVT ppx: lovenox     Code: Full Code  NOK:   Jim Melendez (Spouse)  [phone] (Home Phone)        Monse Horan MD  Neurology, PGY-1

## 2024-07-17 ENCOUNTER — APPOINTMENT (OUTPATIENT)
Dept: NEUROLOGY | Facility: HOSPITAL | Age: 39
DRG: 880 | End: 2024-07-17
Payer: COMMERCIAL

## 2024-07-17 LAB
25(OH)D3 SERPL-MCNC: 34 NG/ML (ref 30–100)
ALBUMIN SERPL BCP-MCNC: 4 G/DL (ref 3.4–5)
ANION GAP SERPL CALC-SCNC: 15 MMOL/L (ref 10–20)
BUN SERPL-MCNC: 15 MG/DL (ref 6–23)
CALCIUM SERPL-MCNC: 9.3 MG/DL (ref 8.6–10.6)
CHLORIDE SERPL-SCNC: 107 MMOL/L (ref 98–107)
CHOLEST SERPL-MCNC: 184 MG/DL (ref 0–199)
CHOLESTEROL/HDL RATIO: 3.1
CO2 SERPL-SCNC: 19 MMOL/L (ref 21–32)
CREAT SERPL-MCNC: 0.87 MG/DL (ref 0.5–1.05)
EGFRCR SERPLBLD CKD-EPI 2021: 88 ML/MIN/1.73M*2
FOLATE SERPL-MCNC: 9.5 NG/ML
GLUCOSE BLD MANUAL STRIP-MCNC: 158 MG/DL (ref 74–99)
GLUCOSE BLD MANUAL STRIP-MCNC: 191 MG/DL (ref 74–99)
GLUCOSE BLD MANUAL STRIP-MCNC: 208 MG/DL (ref 74–99)
GLUCOSE BLD MANUAL STRIP-MCNC: 251 MG/DL (ref 74–99)
GLUCOSE SERPL-MCNC: 181 MG/DL (ref 74–99)
HBV CORE AB SER QL: NONREACTIVE
HBV CORE IGM SER QL: NONREACTIVE
HBV SURFACE AB SER-ACNC: 4.4 MIU/ML
HBV SURFACE AG SERPL QL IA: NONREACTIVE
HCV AB SER QL: NONREACTIVE
HDLC SERPL-MCNC: 58.6 MG/DL
HIV 1+2 AB+HIV1 P24 AG SERPL QL IA: NONREACTIVE
IGA SERPL-MCNC: 261 MG/DL (ref 70–400)
IGG SERPL-MCNC: 1150 MG/DL (ref 700–1600)
IGM SERPL-MCNC: 242 MG/DL (ref 40–230)
LDLC SERPL CALC-MCNC: 97 MG/DL
MAGNESIUM SERPL-MCNC: 2.25 MG/DL (ref 1.6–2.4)
NON HDL CHOLESTEROL: 125 MG/DL (ref 0–149)
PHOSPHATE SERPL-MCNC: 2.5 MG/DL (ref 2.5–4.9)
POTASSIUM SERPL-SCNC: 4.1 MMOL/L (ref 3.5–5.3)
SARS-COV-2 IGG SERPLBLD QL IA.RAPID: NEGATIVE
SODIUM SERPL-SCNC: 137 MMOL/L (ref 136–145)
TREPONEMA PALLIDUM IGG+IGM AB [PRESENCE] IN SERUM OR PLASMA BY IMMUNOASSAY: NONREACTIVE
TRIGL SERPL-MCNC: 141 MG/DL (ref 0–149)
VARICELLA ZOSTER IGG INDEX: 3.9 IA
VIT B12 SERPL-MCNC: 496 PG/ML (ref 211–911)
VLDL: 28 MG/DL (ref 0–40)
VZV IGG SER QL IA: POSITIVE

## 2024-07-17 PROCEDURE — 99232 SBSQ HOSP IP/OBS MODERATE 35: CPT

## 2024-07-17 PROCEDURE — 86706 HEP B SURFACE ANTIBODY: CPT

## 2024-07-17 PROCEDURE — 2500000004 HC RX 250 GENERAL PHARMACY W/ HCPCS (ALT 636 FOR OP/ED)

## 2024-07-17 PROCEDURE — 87522 HEPATITIS C REVRS TRNSCRPJ: CPT

## 2024-07-17 PROCEDURE — 83718 ASSAY OF LIPOPROTEIN: CPT

## 2024-07-17 PROCEDURE — 86780 TREPONEMA PALLIDUM: CPT

## 2024-07-17 PROCEDURE — 86705 HEP B CORE ANTIBODY IGM: CPT

## 2024-07-17 PROCEDURE — 82306 VITAMIN D 25 HYDROXY: CPT

## 2024-07-17 PROCEDURE — 86704 HEP B CORE ANTIBODY TOTAL: CPT

## 2024-07-17 PROCEDURE — 87389 HIV-1 AG W/HIV-1&-2 AB AG IA: CPT

## 2024-07-17 PROCEDURE — 82607 VITAMIN B-12: CPT

## 2024-07-17 PROCEDURE — 84425 ASSAY OF VITAMIN B-1: CPT

## 2024-07-17 PROCEDURE — 86787 VARICELLA-ZOSTER ANTIBODY: CPT

## 2024-07-17 PROCEDURE — 86481 TB AG RESPONSE T-CELL SUSP: CPT

## 2024-07-17 PROCEDURE — 2500000002 HC RX 250 W HCPCS SELF ADMINISTERED DRUGS (ALT 637 FOR MEDICARE OP, ALT 636 FOR OP/ED)

## 2024-07-17 PROCEDURE — 86790 VIRUS ANTIBODY NOS: CPT

## 2024-07-17 PROCEDURE — 1200000002 HC GENERAL ROOM WITH TELEMETRY DAILY

## 2024-07-17 PROCEDURE — 86803 HEPATITIS C AB TEST: CPT

## 2024-07-17 PROCEDURE — 82784 ASSAY IGA/IGD/IGG/IGM EACH: CPT

## 2024-07-17 PROCEDURE — 82947 ASSAY GLUCOSE BLOOD QUANT: CPT

## 2024-07-17 PROCEDURE — 36415 COLL VENOUS BLD VENIPUNCTURE: CPT

## 2024-07-17 PROCEDURE — 95720 EEG PHY/QHP EA INCR W/VEEG: CPT | Performed by: PSYCHIATRY & NEUROLOGY

## 2024-07-17 PROCEDURE — 83735 ASSAY OF MAGNESIUM: CPT

## 2024-07-17 PROCEDURE — 82652 VIT D 1 25-DIHYDROXY: CPT

## 2024-07-17 PROCEDURE — 87340 HEPATITIS B SURFACE AG IA: CPT

## 2024-07-17 PROCEDURE — 82746 ASSAY OF FOLIC ACID SERUM: CPT

## 2024-07-17 PROCEDURE — 95700 EEG CONT REC W/VID EEG TECH: CPT

## 2024-07-17 PROCEDURE — 84100 ASSAY OF PHOSPHORUS: CPT

## 2024-07-17 PROCEDURE — 95715 VEEG EA 12-26HR INTMT MNTR: CPT

## 2024-07-17 RX ORDER — POLYETHYLENE GLYCOL 3350 17 G/17G
17 POWDER, FOR SOLUTION ORAL DAILY
Status: DISCONTINUED | OUTPATIENT
Start: 2024-07-17 | End: 2024-07-19 | Stop reason: HOSPADM

## 2024-07-17 RX ORDER — ENOXAPARIN SODIUM 100 MG/ML
40 INJECTION SUBCUTANEOUS
Status: DISCONTINUED | OUTPATIENT
Start: 2024-07-18 | End: 2024-07-19 | Stop reason: HOSPADM

## 2024-07-17 RX ORDER — PREDNISONE 20 MG/1
TABLET ORAL
Qty: 74 TABLET | Refills: 0 | Status: CANCELLED | OUTPATIENT
Start: 2024-07-20 | End: 2024-08-31

## 2024-07-17 RX ORDER — AMOXICILLIN 250 MG
1 CAPSULE ORAL NIGHTLY
Status: DISCONTINUED | OUTPATIENT
Start: 2024-07-17 | End: 2024-07-19 | Stop reason: HOSPADM

## 2024-07-17 RX ORDER — PANTOPRAZOLE SODIUM 40 MG/1
40 TABLET, DELAYED RELEASE ORAL
Status: DISCONTINUED | OUTPATIENT
Start: 2024-07-18 | End: 2024-07-19 | Stop reason: HOSPADM

## 2024-07-17 RX ORDER — SULFAMETHOXAZOLE AND TRIMETHOPRIM 800; 160 MG/1; MG/1
1 TABLET ORAL 3 TIMES WEEKLY
Qty: 18 TABLET | Refills: 0 | Status: CANCELLED | OUTPATIENT
Start: 2024-07-17 | End: 2024-08-27

## 2024-07-17 ASSESSMENT — PAIN SCALES - PAIN ASSESSMENT IN ADVANCED DEMENTIA (PAINAD)
FACIALEXPRESSION: SMILING OR INEXPRESSIVE
BREATHING: NORMAL
BODYLANGUAGE: RELAXED
CONSOLABILITY: NO NEED TO CONSOLE
TOTALSCORE: 0
TOTALSCORE: MEDICATION (SEE MAR)

## 2024-07-17 ASSESSMENT — PAIN DESCRIPTION - LOCATION: LOCATION: HEAD

## 2024-07-17 ASSESSMENT — PAIN SCALES - GENERAL
PAINLEVEL_OUTOF10: 5 - MODERATE PAIN
PAINLEVEL_OUTOF10: 3

## 2024-07-17 NOTE — SIGNIFICANT EVENT
"Preliminary EEG Report     This vEEG appears normal. No epileptiform activity or lateralizing signs seen.     This EEG was read from 11:34 to 11:57 on 07/17/24 . The final impression will be available tomorrow under Chart Review in the Media tab. To discontinue video EEG, place \"Discontinue Continuous VEEG\" order.      Noe Walsh DO  Adult Epilepsy Fellow   [facility]  "

## 2024-07-17 NOTE — PROGRESS NOTES
"Jennie Melendez is a 38 y.o. female on day 3 of admission presenting with Seizures (Multi).    Subjective     Patient has received 2/5 doses of IVMP.     Patient had 2 episodes of autonomic -> nystagmoid -> generalized shaking yesterday afternoon. Told night RN that she had episode of urinary incontinence during these episodes, though she initially denied this. She states she was very warm after the episode so did not initially notice, but when she got up from the bed she had to change her pants/bed sheets. vEEG started with no epileptiform activity on prelim read (11:34-11:57pm).    Endorses dull \"halo\" headache with left jaw pain last night, improved with sleep.    Telemetry NSR overnight.    Last BM prior to admission - will start bowel regimen today.         Objective     Physical Exam   Gen: NAD, resting comfortably in bed with EEG leads on  Resp: good air exchange bilaterally, normal respiratory effort on RA  CV: RRR, no murmurs/gallops/rubs  Skin: Warm, dry, no rashes on visualized skin  Psych: appropriate mood and affect      Neuro:  CN:  II/III: PERRL, no RAPD  III/IV/VI: EOM intact to smooth pursuit with reduced end-gaze nystagmus on both far left and far right gaze, upbeating nystagmus on up-gaze with horizontal diplopia, Left exotropia on cover-uncover  V: facial sensation intact to light touch  VII: normal and symmetric facial strength, symmetric nasolabial folds  VIII: hearing intact to conversation  IX/X: palate elevates symmetrically  XI: 5/5 strength bilateral SCM and trapezius  XII: Tongue midline  Motor:  5/5 strength in bilateral upper and lower extremities  Cerebellar function:  Finger-nose and heel-shin intact bilaterally    Last Recorded Vitals  Blood pressure 116/76, pulse 82, temperature 36.9 °C (98.4 °F), temperature source Temporal, resp. rate 18, height 1.626 m (5' 4\"), weight 119 kg (262 lb), last menstrual period 06/30/2024, SpO2 95%.  Intake/Output last 3 Shifts:  No intake/output data " recorded.    Medications   Scheduled medications   [Held by provider] enoxaparin, 40 mg, subcutaneous, q12h EVERETT  insulin lispro, 0-5 Units, subcutaneous, TID  methylPREDNISolone sodium succinate (PF), 1,000 mg, intravenous, q18h      PRN medications   PRN medications: acetaminophen **OR** acetaminophen, dextrose, dextrose, glucagon, glucagon, LORazepam, ondansetron     Recent labs  WBC   Date Value Ref Range Status   07/16/2024 5.8 4.4 - 11.3 x10*3/uL Final   07/15/2024 5.2 4.4 - 11.3 x10*3/uL Final   07/14/2024 5.8 4.4 - 11.3 x10*3/uL Final     Hemoglobin   Date Value Ref Range Status   07/16/2024 13.0 12.0 - 16.0 g/dL Final   07/15/2024 12.8 12.0 - 16.0 g/dL Final   07/14/2024 12.3 12.0 - 16.0 g/dL Final     Platelets   Date Value Ref Range Status   07/16/2024 223 150 - 450 x10*3/uL Final   07/15/2024 235 150 - 450 x10*3/uL Final   07/14/2024 218 150 - 450 x10*3/uL Final     Glucose   Date Value Ref Range Status   07/16/2024 89 74 - 99 mg/dL Final   07/15/2024 77 74 - 99 mg/dL Final   07/14/2024 96 74 - 99 mg/dL Final     Sodium   Date Value Ref Range Status   07/16/2024 138 136 - 145 mmol/L Final   07/15/2024 139 136 - 145 mmol/L Final   07/14/2024 136 136 - 145 mmol/L Final     Potassium   Date Value Ref Range Status   07/16/2024 4.3 3.5 - 5.3 mmol/L Final   07/15/2024 4.4 3.5 - 5.3 mmol/L Final   07/14/2024 4.0 3.5 - 5.3 mmol/L Final     Chloride   Date Value Ref Range Status   07/16/2024 107 98 - 107 mmol/L Final   07/15/2024 106 98 - 107 mmol/L Final   07/14/2024 103 98 - 107 mmol/L Final     Urea Nitrogen   Date Value Ref Range Status   07/16/2024 13 6 - 23 mg/dL Final   07/15/2024 12 6 - 23 mg/dL Final   07/14/2024 16 6 - 23 mg/dL Final     Creatinine   Date Value Ref Range Status   07/16/2024 0.84 0.50 - 1.05 mg/dL Final   07/15/2024 0.96 0.50 - 1.05 mg/dL Final   07/14/2024 0.98 0.50 - 1.05 mg/dL Final     Magnesium   Date Value Ref Range Status   07/16/2024 2.20 1.60 - 2.40 mg/dL Final   07/15/2024 2.28  1.60 - 2.40 mg/dL Final   06/01/2024 1.94 1.60 - 2.40 mg/dL Final     Phosphorus   Date Value Ref Range Status   07/16/2024 3.1 2.5 - 4.9 mg/dL Final     Comment:     The performance characteristics of phosphorus testing in heparinized plasma have been validated by the individual  laboratory site where testing is performed. Testing on heparinized plasma is not approved by the FDA; however, such approval is not necessary.     Calcium   Date Value Ref Range Status   07/16/2024 9.1 8.6 - 10.6 mg/dL Final   07/15/2024 9.3 8.6 - 10.6 mg/dL Final   07/14/2024 9.5 8.6 - 10.3 mg/dL Final     Albumin   Date Value Ref Range Status   07/16/2024 3.9 3.4 - 5.0 g/dL Final   07/14/2024 4.4 3.4 - 5.0 g/dL Final   06/01/2024 4.6 3.4 - 5.0 g/dL Final         Imaging   MR cervical spine w and wo IV contrast    Result Date: 7/17/2024  Interpreted By:  Matt Bradley,  and Aranza Woods STUDY: MR CERVICAL SPINE W AND WO IV CONTRAST; MR THORACIC SPINE W AND WO IV CONTRAST;  7/16/2024 10:20 pm   INDICATION: Signs/Symptoms:patient presenting with 3 weeks nystagmus/vertigo and new episode syncope vs seizure, MRI brain showed possible demyelinating lesions; Signs/Symptoms:3 week history of vertigo/nystagmus, MRI brain with possible demyelinating lesions.   COMPARISON: None. MRI of the brain dated 07/16/2024   ACCESSION NUMBER(S): [id number]; [id number]   ORDERING CLINICIAN: AASEF SHAIKH   TECHNIQUE: Sagittal STIR, sagittal T2, sagittal T1, axial T2, axial T1, as well as post gadolinium sagittal axial T1 weighted MRI images through the cervical and thoracic spine were obtained. The patient received 23 mL of Dotarem gadolinium intravenously.   FINDINGS: There is a focus of abnormal intramedullary signal noted within the ventral thoracic spinal cord centered at the T1/2 level measuring approximately 5 mm x 5 mm in transaxial dimensions by 12 mm in the craniocaudal dimension. There is corresponding enhancement on the post gadolinium  images. While nonspecific, given the MRI findings of the brain, the finding is most suspicious for a underlying demyelinating process such as multiple sclerosis with the enhancement representing active demyelination.   There is a questionable additional vague focus of increased T2 weighted signal overlying the ventral spinal cord at the C3/4 level without corresponding enhancement raising the possibility of an additional focus of demyelination as well.   The cervical and thoracic vertebral bodies are in anatomic alignment.   There are mild degenerative signal changes noted along endplates at the C5/6 level.   At the C2/3 level,  there is no significant spinal canal stenosis or neuroforaminal stenosis.   At the C3/4 level,  there is a minimal posterior disc/osteophyte complex without significant spinal canal narrowing. Left-sided degenerative uncovertebral joint changes contributing to mild encroachment upon the left neural foramen there is no significant right-sided neural foraminal narrowing   At the C4/5 level,  there is a minimal posterior disc bulge without significant spinal canal or neural foraminal narrowing   At the C5/6 level,  there is a mild posterior disc/osteophyte complex contributing to mild flattening of the ventral subarachnoid space without spinal cord deformity. There is no significant neural foraminal narrowing.   At the C6/7 level,  there is a mild posterior disc/osteophyte complex contribute to mild flattening of the ventral subarachnoid space without spinal cord deformity. There is no significant neural foraminal narrowing.   At the C7/T1 level,  there is no significant spinal canal stenosis or neuroforaminal stenosis.   At the T1/2 level,  there is no significant spinal canal stenosis or neural foraminal stenosis.   At the T2/3 level, there is no significant spinal canal narrowing.   At the T3/4 level, there is no significant spinal canal narrowing.   At the T4/5 level, there is no  significant spinal canal narrowing.   At the T5/6 level, there is no significant spinal canal narrowing.   At the T6/7 level, there is a left paracentral disc herniation measuring 2-3 mm in the AP dimension contributing to effacement of the left ventral subarachnoid space. There is mild flattening of the left ventral thoracic spinal cord which is draped over the disc herniation.   At the T7/8 level, there is a left-sided disc and disc/osteophyte complex measuring 2 mm in the AP dimension contributing to effacement of the left ventral subarachnoid space. There is minimal flattening of the left ventral thoracic spinal cord which is draped over the disc and/or disc/osteophyte complex.   At the T8/9 level, there is a minimal left-sided disc and disc/osteophyte complex without significant spinal canal narrowing.   At the T9/10 level, there is a minimal posterior disc/osteophyte complex without significant spinal canal narrowing.   At the T10/11 level, there is no significant spinal canal narrowing.   At the T11/12 level, there is no significant spinal canal narrowing.   At the T12/L1 level, there is no significant spinal canal narrowing.       There is a focus of abnormal intramedullary signal noted within the ventral thoracic spinal cord centered at the T1/2 level measuring approximately 5 mm x 5 mm in transaxial dimensions by 12 mm in the craniocaudal dimension. There is corresponding enhancement on the post gadolinium images. While nonspecific, given the MRI findings of the brain, the finding is most suspicious for a underlying demyelinating process such as multiple sclerosis with the enhancement representing active demyelination.   There is a questionable additional vague focus of increased T2 signal overlying the ventral spinal cord at the C3/4 level without corresponding enhancement raising the possibility of an additional focus of demyelination as well.   There is multilevel spondylosis within the cervical and  thoracic region as described above.   I personally reviewed the images/study and I agree with the findings as stated by Dr. Chuck Cobian M.D. This study was interpreted at [facility], [place], [place].   MACRO: None.   Signed by: Matt Bradley 7/17/2024 6:52 AM Dictation workstation:   [id number]    MR thoracic spine w and wo IV contrast    Result Date: 7/17/2024  Interpreted By:  Matt Bradley,  and Aranza Woods STUDY: MR CERVICAL SPINE W AND WO IV CONTRAST; MR THORACIC SPINE W AND WO IV CONTRAST;  7/16/2024 10:20 pm   INDICATION: Signs/Symptoms:patient presenting with 3 weeks nystagmus/vertigo and new episode syncope vs seizure, MRI brain showed possible demyelinating lesions; Signs/Symptoms:3 week history of vertigo/nystagmus, MRI brain with possible demyelinating lesions.   COMPARISON: None. MRI of the brain dated 07/16/2024   ACCESSION NUMBER(S): [id number]; [id number]   ORDERING CLINICIAN: AASEF SHAIKH   TECHNIQUE: Sagittal STIR, sagittal T2, sagittal T1, axial T2, axial T1, as well as post gadolinium sagittal axial T1 weighted MRI images through the cervical and thoracic spine were obtained. The patient received 23 mL of Dotarem gadolinium intravenously.   FINDINGS: There is a focus of abnormal intramedullary signal noted within the ventral thoracic spinal cord centered at the T1/2 level measuring approximately 5 mm x 5 mm in transaxial dimensions by 12 mm in the craniocaudal dimension. There is corresponding enhancement on the post gadolinium images. While nonspecific, given the MRI findings of the brain, the finding is most suspicious for a underlying demyelinating process such as multiple sclerosis with the enhancement representing active demyelination.   There is a questionable additional vague focus of increased T2 weighted signal overlying the ventral spinal cord at the C3/4 level without corresponding enhancement raising the possibility of an additional focus of  demyelination as well.   The cervical and thoracic vertebral bodies are in anatomic alignment.   There are mild degenerative signal changes noted along endplates at the C5/6 level.   At the C2/3 level,  there is no significant spinal canal stenosis or neuroforaminal stenosis.   At the C3/4 level,  there is a minimal posterior disc/osteophyte complex without significant spinal canal narrowing. Left-sided degenerative uncovertebral joint changes contributing to mild encroachment upon the left neural foramen there is no significant right-sided neural foraminal narrowing   At the C4/5 level,  there is a minimal posterior disc bulge without significant spinal canal or neural foraminal narrowing   At the C5/6 level,  there is a mild posterior disc/osteophyte complex contributing to mild flattening of the ventral subarachnoid space without spinal cord deformity. There is no significant neural foraminal narrowing.   At the C6/7 level,  there is a mild posterior disc/osteophyte complex contribute to mild flattening of the ventral subarachnoid space without spinal cord deformity. There is no significant neural foraminal narrowing.   At the C7/T1 level,  there is no significant spinal canal stenosis or neuroforaminal stenosis.   At the T1/2 level,  there is no significant spinal canal stenosis or neural foraminal stenosis.   At the T2/3 level, there is no significant spinal canal narrowing.   At the T3/4 level, there is no significant spinal canal narrowing.   At the T4/5 level, there is no significant spinal canal narrowing.   At the T5/6 level, there is no significant spinal canal narrowing.   At the T6/7 level, there is a left paracentral disc herniation measuring 2-3 mm in the AP dimension contributing to effacement of the left ventral subarachnoid space. There is mild flattening of the left ventral thoracic spinal cord which is draped over the disc herniation.   At the T7/8 level, there is a left-sided disc and  disc/osteophyte complex measuring 2 mm in the AP dimension contributing to effacement of the left ventral subarachnoid space. There is minimal flattening of the left ventral thoracic spinal cord which is draped over the disc and/or disc/osteophyte complex.   At the T8/9 level, there is a minimal left-sided disc and disc/osteophyte complex without significant spinal canal narrowing.   At the T9/10 level, there is a minimal posterior disc/osteophyte complex without significant spinal canal narrowing.   At the T10/11 level, there is no significant spinal canal narrowing.   At the T11/12 level, there is no significant spinal canal narrowing.   At the T12/L1 level, there is no significant spinal canal narrowing.       There is a focus of abnormal intramedullary signal noted within the ventral thoracic spinal cord centered at the T1/2 level measuring approximately 5 mm x 5 mm in transaxial dimensions by 12 mm in the craniocaudal dimension. There is corresponding enhancement on the post gadolinium images. While nonspecific, given the MRI findings of the brain, the finding is most suspicious for a underlying demyelinating process such as multiple sclerosis with the enhancement representing active demyelination.   There is a questionable additional vague focus of increased T2 signal overlying the ventral spinal cord at the C3/4 level without corresponding enhancement raising the possibility of an additional focus of demyelination as well.   There is multilevel spondylosis within the cervical and thoracic region as described above.   I personally reviewed the images/study and I agree with the findings as stated by Dr. Chuck Cobian M.D. This study was interpreted at [place], [place].   MACRO: None.   Signed by: Matt Bradley 7/17/2024 6:52 AM Dictation workstation:   [id number]    MR orbit w and wo IV contrast    Result Date: 7/17/2024  Interpreted By:  Matt Bradley and Baker  Chuck STUDY: MR BRAIN W AND WO IV CONTRAST; MR ORBIT W AND WO IV CONTRAST; 7/16/2024 10:20 pm   INDICATION: Signs/Symptoms:patient presenting with 3 weeks nystagmus/vertigo and new episode syncope vs seizure, MRI brain showed possible demyelinating lesions, need thin cuts to assess for brainstem pathology; Signs/Symptoms:3 weeks constant vertigo with nystagmus.   COMPARISON: None. MRI brain on 07/15/2024.   ACCESSION NUMBER(S): [id number]; [id number]   ORDERING CLINICIAN: AASEF SHAIKH   TECHNIQUE: Axial diffusion, axial T2, axial FLAIR, axial gradient echo T2, axial T1, post gadolinium volumetric T1, as well as post gadolinium axial T1 weighted MRI images of the brain were obtained. High-resolution coronal T2 as well as pre and post gadolinium coronal and axial T1 weighted MRI images through the orbits were obtained. The patient received  23 mL of  Dotarem gadolinium intravenously.   FINDINGS: The ventricular system remains nondilated.   There is again evidence of scattered nonspecific white matter changes within the cerebral hemispheres bilaterally unchanged in appearance when compared with the prior study dated 07/15/2024. Again, a few of the periventricular white matter changes demonstrate a somewhat perpendicular orientation to the lateral ventricles a pattern which can be seen with underlying demyelinating processes such as multiple sclerosis.   The post gadolinium images again demonstrate a subcentimeter focus of enhancement corresponding to a focal area of nonspecific white matter change within the left parietal lobe. There is mild increased diffusion signal corresponding to this focus of white matter change and enhancement as seen on axial diffusion slice 58 of 78 without corresponding diminished signal on the ADC map. The constellation of MRI findings is most suspicious for a focus of active demyelination. Other considerations such as a small focus of enhancing evolving subacute infarction is felt  less likely but not entirely excluded. Clinical correlation is recommended.   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   The MRI of the orbits demonstrates no abnormal intraorbital mass lesion. The extraocular muscles are within normal limits in size and configuration. The globes are intact. No signal abnormality or abnormal enhancement is identified along the optic nerves.   The visualized paranasal sinuses and mastoid air cells are clear.       There is again evidence of scattered nonspecific white matter changes within the cerebral hemispheres bilaterally unchanged in appearance when compared with the prior study dated 07/15/2024. Again, a few of the periventricular white matter changes demonstrate a somewhat perpendicular orientation to the lateral ventricles a pattern which can be seen with underlying demyelinating processes such as multiple sclerosis.   The post gadolinium images again demonstrate a subcentimeter focus of enhancement corresponding to a focal area of nonspecific white matter change within the left parietal lobe. There is mild increased diffusion signal corresponding to this focus of white matter change and enhancement as seen on axial diffusion slice 58 of 78 without corresponding diminished signal on the ADC map. The constellation of MRI findings is most suspicious for a focus of active demyelination. Other considerations such as a small focus of enhancing evolving subacute infarction is felt less likely but not entirely excluded. Clinical correlation is recommended.   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   The MRI of the orbits demonstrates no abnormal intraorbital mass lesion. The extraocular muscles are within normal limits in size and configuration. The globes are intact. No signal abnormality or abnormal enhancement is identified along the optic nerves.   I personally reviewed the images/study and I agree with the findings as stated by Dr. Chuck Cobian M.D. This study was  interpreted at [facility], [place], [place].   MACRO: None.   Signed by: Matt Bradley 7/17/2024 6:27 AM Dictation workstation:   [id number]    MR brain w and wo IV contrast    Result Date: 7/17/2024  Interpreted By:  Matt Bradley  and Aranza Woods STUDY: MR BRAIN W AND WO IV CONTRAST; MR ORBIT W AND WO IV CONTRAST; 7/16/2024 10:20 pm   INDICATION: Signs/Symptoms:patient presenting with 3 weeks nystagmus/vertigo and new episode syncope vs seizure, MRI brain showed possible demyelinating lesions, need thin cuts to assess for brainstem pathology; Signs/Symptoms:3 weeks constant vertigo with nystagmus.   COMPARISON: None. MRI brain on 07/15/2024.   ACCESSION NUMBER(S): [id number]; [id number]   ORDERING CLINICIAN: AASEF SHAIKH   TECHNIQUE: Axial diffusion, axial T2, axial FLAIR, axial gradient echo T2, axial T1, post gadolinium volumetric T1, as well as post gadolinium axial T1 weighted MRI images of the brain were obtained. High-resolution coronal T2 as well as pre and post gadolinium coronal and axial T1 weighted MRI images through the orbits were obtained. The patient received  23 mL of  Dotarem gadolinium intravenously.   FINDINGS: The ventricular system remains nondilated.   There is again evidence of scattered nonspecific white matter changes within the cerebral hemispheres bilaterally unchanged in appearance when compared with the prior study dated 07/15/2024. Again, a few of the periventricular white matter changes demonstrate a somewhat perpendicular orientation to the lateral ventricles a pattern which can be seen with underlying demyelinating processes such as multiple sclerosis.   The post gadolinium images again demonstrate a subcentimeter focus of enhancement corresponding to a focal area of nonspecific white matter change within the left parietal lobe. There is mild increased diffusion signal corresponding to this focus of white matter change and enhancement as  seen on axial diffusion slice 58 of 78 without corresponding diminished signal on the ADC map. The constellation of MRI findings is most suspicious for a focus of active demyelination. Other considerations such as a small focus of enhancing evolving subacute infarction is felt less likely but not entirely excluded. Clinical correlation is recommended.   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   The MRI of the orbits demonstrates no abnormal intraorbital mass lesion. The extraocular muscles are within normal limits in size and configuration. The globes are intact. No signal abnormality or abnormal enhancement is identified along the optic nerves.   The visualized paranasal sinuses and mastoid air cells are clear.       There is again evidence of scattered nonspecific white matter changes within the cerebral hemispheres bilaterally unchanged in appearance when compared with the prior study dated 07/15/2024. Again, a few of the periventricular white matter changes demonstrate a somewhat perpendicular orientation to the lateral ventricles a pattern which can be seen with underlying demyelinating processes such as multiple sclerosis.   The post gadolinium images again demonstrate a subcentimeter focus of enhancement corresponding to a focal area of nonspecific white matter change within the left parietal lobe. There is mild increased diffusion signal corresponding to this focus of white matter change and enhancement as seen on axial diffusion slice 58 of 78 without corresponding diminished signal on the ADC map. The constellation of MRI findings is most suspicious for a focus of active demyelination. Other considerations such as a small focus of enhancing evolving subacute infarction is felt less likely but not entirely excluded. Clinical correlation is recommended.   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   The MRI of the orbits demonstrates no abnormal intraorbital mass lesion. The extraocular muscles are within  normal limits in size and configuration. The globes are intact. No signal abnormality or abnormal enhancement is identified along the optic nerves.   I personally reviewed the images/study and I agree with the findings as stated by Dr. Chuck Cobian M.D. This study was interpreted at [facility], [place], [place].   MACRO: None.   Signed by: Matt Bradley 7/17/2024 6:27 AM Dictation workstation:   [id number]    EEG    IMPRESSION Impression This routine EEG is normal. No epileptiform discharges or lateralizing signs were seen. A full report will be scanned into the patient's chart at a later time. This report has been interpreted and electronically signed by               Assessment/Plan   Principal Problem:    Seizures (Multi)    Ms. Melendez is a 37yo woman with no significant PMHx who presented to  on 7/14 with c/f first-time seizure. 3 weeks prior to presentation the patient began experiencing constant vertigo and dizziness. Then on 7/14 the patient had sudden-onset flushing/sensation of heat, lightheadedness, and dizziness followed by collapse with brief LOC (no post-ictal confusion/lethargy) that was then followed by generalized BUE shaking that progressed to BLE shaking. At OSH the patient was noted to have R gaze deviation and L sided weakness c/f Demetrius's paralysis so she received 2x 1 mg ativan and was loaded with 2400 mg Keppra. The weakness improved over several hours. Patient had similar episode on 7/16 after walking with PT, no LOC but did have urinary incontinence. The semiology (autonomic -> nystagmoid -> generalized shaking with intact consciousness) is not c/w epileptic event. Now on vEEG to capture future events.    MRIb with few punctate T2/FLAIR white matter hyperintensities including periventricular lesions and few enhancing foci in the L posterior parietal and occipital lobes. MRI C/T-spine showed enhancing intramedullary STIR signal at T1-2 level and questionable  focus of T2 signal at C3-4 without enhancement.     Per [place] criteria she meets the criteria for a diagnosis of multiple sclerosis (1 clinical attack with >2 lesions on MRI and dissemination in time on MRI (mix of enhancing and nonenhancing lesions)).     Patient will receive IVMP dose 3/5 this evening.     #vertigo with horizontal and vertical nystagmus  #multiple sclerosis  #scattered T2/FLAIR white matter hyperintensities with enhancing foci in L posterior parietal and occipital lobes, enhancing intramedullary STIR hyperintensity at T1-2  ::famhx lupus in mother and sister  ::Utox negative  ::RF negative  ::7/16 MRI orbit unremarkable  ::7/16 MRIb with scattered white matter changes bilaterally including several periventricular lesions and enhancing white matter lesion in L parietal lobe  ::7/16 MRI C/T-spine with enhancing intramedullary lesion at T1-2 level and questionable focus of T2 hyperintensity at C3-4 without enhancement  [ ] ANETA with reflex pending  [ ] continue 1g IVMP q18h x5 doses with SSI, will receive dose 3/5 today  [ ] ophthalmology consulted - will see her today     #non-epileptic generalized shaking vs epileptic events  :: s/p 2400 mg keppra load  :: semiology inconsistent with an epileptic event (autonomic -> nystagmoid -> generalized shaking with intact consciousness)   :: 7/15 routine EEG nl  :: orthostatic vitals nl (140/87 P82 lying down -> 135/87 P86 standing)  - no current need for AEDs  [ ] telemetry to monitor for intermittent arrhythmia/cardiogenic syncope  [ ] continue vEEG for 24 hours (started 7/16 11:30pm)    #dispo  [ ] send immunotherapy labs  [ ] schedule follow-up with neuro-immunology within 4-6 weeks    F: PRN  E: PRN  N: Regular  A: pIV  DVT ppx: lovenox     Code: Full Code  NOK:   Jim Melendez (Spouse)  [phone] (Home Phone)        Monse Horan MD  Neurology, PGY-1

## 2024-07-17 NOTE — DISCHARGE INSTRUCTIONS
Dear Ms. Melendez,    You were admitted to the hospital because you had an episode of fainting followed by generalized body shaking. When you arrived to the emergency room, the emergency room doctors were concerned that you might have had a seizure, so they gave you a large dose of the antiseizure medications keppra and ativan. You were then transferred to [facility]. Since you could remember the shaking events even though the shaking was on both sides of your body, we did not think your episode was a seizure, and we did not continue you on anti-seizure medications. While you were in the hospital you had a few similar episodes. We monitored you with continuous video EEG (electrodes on your head to measure your brain activity) and we did not see any seizure activity. We also monitored your heart rhythm with constant telemetry and did not see any irregular heart beats to explain your symptoms. We initially thought these episodes could possibly be related to vasovagal syncope, but this is less likely since your blood pressure and heart rate do not decrease during these episodes.     Since your EEG (brain activity) and heart activity were normal, we suspect the episodes you've been having may be panic attacks (aka anxiety attacks). Elevated heart rate, heavy breathing, flushing, and shaking can all occur during a panic attack. The recommended treatment of panic attacks is a combination of antidepressants and cognitive behavioral therapy. We prescribed you the antidepressant sertraline (zoloft) 25 mg daily. If you tolerate the 25 mg dose, you can increase to 50 mg daily after 1 week. It will take 4-8 weeks of daily sertraline to start seeing an improvement in your symptoms. Since we are starting you on a low dose, if you experience significant side effects you can't tolerate (fatigue, loss of libido, GI upset, etc.), you can stop taking the medication. We recommend seeing a psychiatrist  within 2 months to discuss how the sertraline (zoloft) has helped your symptoms and decide whether to increase the dose or switch to a different medication. If you continue having frequent episodes that significantly impact your life, we recommend following up with your primary care provider, as well as discussing these symptoms at your neuro-immunology follow-up appointment for your multiple sclerosis. Additional information about panic attacks is attached.    While you were in the hospital, we learned that you had a 3 week history of constant dizziness, and we noticed that you had nystagmus (back-and-forth beating of the eyes). To understand the cause of these symptoms we obtained MRI images of your brain, orbit, and spine. The pictures of your brain did not show any mass or tumor. The pictures did show several small bright lesions in the white matter of your brain. We saw similar bright lesions in your spinal cord MRI. These lesions likely represent inflammation around the nerves, a disease known as multiple sclerosis. Multiple sclerosis is an autoimmune disease where your immune system becomes overactive and damages the myelin (aka insulation coating) that surrounds the nerves in your body. This damage or inflammation of the nerves is likely what caused your dizziness and abnormal eye movements (nystagmus). Additional information about multiple sclerosis is attached.    During your hospital stay you were seen by ophthalmology to evaluate your abnormal eye movements. They recommended taking a thiamine (vitamin B1) supplement three times a day for three days, as thiamine deficiency can worsen abnormal eye movements. You started this supplement in the hospital and you should continue taking it at home until you run out of the thiamine pills we gave you (last dose should be Saturday night).    We treated your multiple sclerosis in the hospital with high dose steroids delivered through your IV. These steroids will  reduce the inflammation around your nerves and limit further damage to the myelin (aka insulation) that surrounds your nerves. When you go home you will continue taking oral steroids for 6 weeks. Each week you will decrease the steroid dose by 10 mg (see schedule below). While you are taking the steroids you will need to take pantoprazole (protonix) every day. This is a medication that reduces acid production in the stomach and will prevent the steroids from damaging the lining of your stomach. You will also need to take bactrim (an antibiotic) every Monday, Wednesday, and Friday to prevent pneumonia while you are taking the steroids, since steroids weaken the immune system and can make you more likely to get an infection. All three of these medications (pantoprazole/protonix, bactrim, prednisone) are safe to take while breastfeeding.    On your last day in the hospital you developed a new sore throat, so we collected a Covid swab and respiratory virus panel. If your results are positive and your symptoms do not resolve within several days, we recommend following up with your primary care provider.    Follow-Up Appointments:  We have placed a referral for you to follow-up with one of our neuro-immunologists within 1-2 months. This neuro-immunologist will discuss your long-term treatment options for your multiple sclerosis. These disease-modifying treatments will help prevent future multiple sclerosis flares and progression of the disease. We collected blood work while you were in the hospital to check you for latent infections that can become active when receiving long-term multiple sclerosis treatment. Your neuro-immunologist will discuss the results of this lab work with you. You will be called by the scheduling service to set up your neuro-immunology appointment. If you do not hear from them within 3 days, please call [phone] to schedule an appointment with .     We also recommend following-up with the  eye doctor within 1 month to ensure your abnormal eye movements and dizziness continue to improve. You will be called by the scheduling service to set up this appointment. If you do not hear from them within 3 days, please call [phone] (EYES) to make an appointment with a  eye doctor.     You will also need to follow-up with either your PCP or a psychiatrist within 2 months. At this appointment you can discuss with them whether the sertraline (zoloft) improved your symptoms and whether you should change the dose or consider an alternative.    Homegoing Medications:  Please start taking Vitamin D 50,000IU once per week - we sent this prescription to your local pharmacy ([facility])  Thiamine (vitamin B1) 100 mg three times daily (last dose Saturday night)  Sertraline (zoloft) 25 mg daily for 1 week, then can increase to 50 mg daily if you tolerate it  Pantoprazole (protonix) 40 mg daily while taking prednisone (7/20 - 8/30)  Bactrim every Monday, Wednesday, and Friday while taking prednisone (7/20 - 8/30)  6 week prednisone taper:  - 60 mg daily for 1 week (7/20 - 7/26)  - 50 mg daily for 1 week (7/27 - 8/2)  - 40 mg daily for 1 week (8/3 - 8/9)  - 30 mg daily for 1 week (8/10 - 8/16)  - 20 mg daily for 1 week (8/17 - 8/23)  - 10 mg daily for 1 week (8/24-8/30)    It was a pleasure taking care of you!    Sincerely,  Your  Neurology team

## 2024-07-17 NOTE — CONSULTS
Reason For Consult  nystagmus    History Of Present Illness  Jennie Melendez is a 38 y.o. female with no significant PMH presenting with multiple episodes concerning for seizures, vertigo, as well as new nystagmus, with new diagnosis of multiple sclerosis, now s/p 2 doses IVMP, with ophthalmology consulted to assess nystagmus.     Pt states that 3 weeks ago, began having constant vertigo as well as nystagmus. After this, she had 5-6 seizure-like episodes. Today, she reports improvement in nystagmus since initial presentation, and denies vision changes, flashes, floaters. She does endorse monocular diplopia of the right eye. She also endorses diplopia on extreme gazes.     She has past ocular hx of high myopia and wears CL and glasses.      Past Medical History  She has no past medical history on file.    Surgical History  She has no past surgical history on file.    Social History  She reports that she has never smoked. She has never used smokeless tobacco. She reports that she does not currently use alcohol. She reports that she does not use drugs.    Family History  No family history on file.     Allergies  Patient has no known allergies.    Review of Systems  See above     Physical Exam  Base Eye Exam       Visual Acuity (Snellen - Linear)         Right Left    Near cc 20/20 20/20-1              Tonometry (Tonopen, 12:19 PM)         Right Left    Pressure 19 22              Pupils         Dark Light Shape React APD    Right 6 3 Round Brisk None    Left 6 3 Round Brisk None              Visual Fields         Left Right     Full Full              Extraocular Movement         Right Left     Full, Ortho Full, Ortho              Dilation       Both eyes: 1% Mydriacyl & 2.5% Maximus  @ 12:24 PM                  Additional Tests       Color         Right Left    Ishihara 11/11 11/11                  Slit Lamp and Fundus Exam       External Exam         Right Left    External Normal Normal              Slit Lamp Exam          "Right Left    Lids/Lashes Normal Normal    Conjunctiva/Sclera White and quiet White and quiet    Cornea tr inf spk Clear    Anterior Chamber Deep and quiet Deep and quiet    Iris Round and reactive Round and reactive    Lens Clear Clear    Anterior Vitreous Normal Normal              Fundus Exam         Right Left    Disc Normal Normal    C/D Ratio 0.3 0.3    Macula Normal Normal    Vessels Normal Normal    Periphery Normal Normal                     Last Recorded Vitals  Blood pressure 123/80, pulse 69, temperature 36.1 °C (97 °F), temperature source Temporal, resp. rate 16, height 1.626 m (5' 4\"), weight 119 kg (262 lb), last menstrual period 06/30/2024, SpO2 97%.    Relevant Results    MRI brain and orbits with and without contrast:   Notable for scattered cerebral hemisphere white matter lesions, pineal gland cyst. No enhancement of optic nerve.      Assessment/Plan     37yo female with no significant PMH presenting with 3 weeks of constant vertigo and eye shaking as well as several seizure-like episodes with new diagnosis of multiple sclerosis, now s/p 2 doses IVMP, with ophthalmology consulted for nystagmus. Exam today with excellent vision, no APD, full EOMs, gaze-evoked nystagmus on up, right, and left extreme gaze, which is slow beating and towards direction of the gaze. Pt feels nystagmus has improved with the steroids. Gaze is orthotropic. Anterior and posterior segment exam unremarkable with no optic disc edema. MRI with multiple white matter lesions around the lateral ventricles and an enhancing lesion in the left parietal lobe and enhancing lesion on MRI t-spine as well as pineal gland cyst. MRI orbits was normal. Overall, suspect the gaze evoked nystagmus is 2/2 multiple sclerosis, despite absence of lesions in cerebellum or brainstem. Low concern for dorsal midbrain syndrome given the pineal cyst, as there is no convergence retraction nystagmus or light near dissociation. Low concern for Wernicke " encephalopathy causing nystagmus, however will recommend checking B-vitamin levels and supplementing thiamine.     Recommendations:   - Obtain B vitamin labs (B1, B9, B12)  - Start thiamine repletion after lab draw: 100mg PO TID x3 days (do not wait for labs to result before starting repletion)    Pt contact info: [phone]    Pt discussed with attending physician and neuro-ophthalmologist, Dr. Dupree.     Recommend follow-up with  [facility] within 1 month. Please call [phone] (E[facility]) to make appointment.     Mahsa Nowak MD  Ophthalmology, PGY3     Ophthalmology Adult Pager - [phone]  Ophthalmology Pediatrics Pager (M-F 8:00am-5:00pm) - [phone]     For adult follow-up appointments, call: [phone]  For pediatric follow-up appointments, call: [phone]

## 2024-07-17 NOTE — NURSING NOTE
"Patient told this RN that she had an incontinence episode while experiencing \"seizure like\" episodes during the day. RN notified Neurology team.      Veronica Adams RN  7/16/24  "

## 2024-07-18 LAB
ALBUMIN SERPL BCP-MCNC: 3.8 G/DL (ref 3.4–5)
ANA SER QL HEP2 SUBST: NEGATIVE
ANION GAP SERPL CALC-SCNC: 15 MMOL/L (ref 10–20)
BASOPHILS # BLD AUTO: 0.02 X10*3/UL (ref 0–0.1)
BASOPHILS NFR BLD AUTO: 0.1 %
BUN SERPL-MCNC: 16 MG/DL (ref 6–23)
CALCIUM SERPL-MCNC: 9.1 MG/DL (ref 8.6–10.6)
CHLORIDE SERPL-SCNC: 107 MMOL/L (ref 98–107)
CO2 SERPL-SCNC: 21 MMOL/L (ref 21–32)
CREAT SERPL-MCNC: 0.85 MG/DL (ref 0.5–1.05)
EGFRCR SERPLBLD CKD-EPI 2021: 90 ML/MIN/1.73M*2
EOSINOPHIL # BLD AUTO: 0 X10*3/UL (ref 0–0.7)
EOSINOPHIL NFR BLD AUTO: 0 %
ERYTHROCYTE [DISTWIDTH] IN BLOOD BY AUTOMATED COUNT: 13.9 % (ref 11.5–14.5)
GLUCOSE BLD MANUAL STRIP-MCNC: 124 MG/DL (ref 74–99)
GLUCOSE BLD MANUAL STRIP-MCNC: 136 MG/DL (ref 74–99)
GLUCOSE BLD MANUAL STRIP-MCNC: 195 MG/DL (ref 74–99)
GLUCOSE BLD MANUAL STRIP-MCNC: 237 MG/DL (ref 74–99)
GLUCOSE SERPL-MCNC: 138 MG/DL (ref 74–99)
HCT VFR BLD AUTO: 45.6 % (ref 36–46)
HCV RNA SERPL NAA+PROBE-ACNC: NOT DETECTED K[IU]/ML
HCV RNA SERPL NAA+PROBE-LOG IU: NORMAL {LOG_IU}/ML
HGB BLD-MCNC: 13.7 G/DL (ref 12–16)
IMM GRANULOCYTES # BLD AUTO: 0.12 X10*3/UL (ref 0–0.7)
IMM GRANULOCYTES NFR BLD AUTO: 0.6 % (ref 0–0.9)
LYMPHOCYTES # BLD AUTO: 1.06 X10*3/UL (ref 1.2–4.8)
LYMPHOCYTES NFR BLD AUTO: 5.3 %
MCH RBC QN AUTO: 28.8 PG (ref 26–34)
MCHC RBC AUTO-ENTMCNC: 30 G/DL (ref 32–36)
MCV RBC AUTO: 96 FL (ref 80–100)
MONOCYTES # BLD AUTO: 0.3 X10*3/UL (ref 0.1–1)
MONOCYTES NFR BLD AUTO: 1.5 %
NEUTROPHILS # BLD AUTO: 18.49 X10*3/UL (ref 1.2–7.7)
NEUTROPHILS NFR BLD AUTO: 92.5 %
NRBC BLD-RTO: 0 /100 WBCS (ref 0–0)
PHOSPHATE SERPL-MCNC: 4.1 MG/DL (ref 2.5–4.9)
PLATELET # BLD AUTO: 253 X10*3/UL (ref 150–450)
POTASSIUM SERPL-SCNC: 4.6 MMOL/L (ref 3.5–5.3)
RBC # BLD AUTO: 4.76 X10*6/UL (ref 4–5.2)
SODIUM SERPL-SCNC: 138 MMOL/L (ref 136–145)
WBC # BLD AUTO: 20 X10*3/UL (ref 4.4–11.3)

## 2024-07-18 PROCEDURE — 2500000001 HC RX 250 WO HCPCS SELF ADMINISTERED DRUGS (ALT 637 FOR MEDICARE OP)

## 2024-07-18 PROCEDURE — 36415 COLL VENOUS BLD VENIPUNCTURE: CPT

## 2024-07-18 PROCEDURE — 88185 FLOWCYTOMETRY/TC ADD-ON: CPT | Mod: TC

## 2024-07-18 PROCEDURE — 82947 ASSAY GLUCOSE BLOOD QUANT: CPT

## 2024-07-18 PROCEDURE — 95720 EEG PHY/QHP EA INCR W/VEEG: CPT | Performed by: PSYCHIATRY & NEUROLOGY

## 2024-07-18 PROCEDURE — RXMED WILLOW AMBULATORY MEDICATION CHARGE

## 2024-07-18 PROCEDURE — 80069 RENAL FUNCTION PANEL: CPT

## 2024-07-18 PROCEDURE — 2500000004 HC RX 250 GENERAL PHARMACY W/ HCPCS (ALT 636 FOR OP/ED)

## 2024-07-18 PROCEDURE — 1200000002 HC GENERAL ROOM WITH TELEMETRY DAILY

## 2024-07-18 PROCEDURE — 95715 VEEG EA 12-26HR INTMT MNTR: CPT

## 2024-07-18 PROCEDURE — 85025 COMPLETE CBC W/AUTO DIFF WBC: CPT

## 2024-07-18 RX ORDER — INSULIN LISPRO 100 [IU]/ML
0-10 INJECTION, SOLUTION INTRAVENOUS; SUBCUTANEOUS
Status: DISCONTINUED | OUTPATIENT
Start: 2024-07-18 | End: 2024-07-19 | Stop reason: HOSPADM

## 2024-07-18 RX ORDER — SERTRALINE HYDROCHLORIDE 25 MG/1
25 TABLET, FILM COATED ORAL DAILY
Qty: 30 TABLET | Refills: 1 | Status: SHIPPED | OUTPATIENT
Start: 2024-07-18 | End: 2024-09-16

## 2024-07-18 RX ORDER — PANTOPRAZOLE SODIUM 40 MG/1
40 TABLET, DELAYED RELEASE ORAL
Qty: 42 TABLET | Refills: 0 | Status: SHIPPED | OUTPATIENT
Start: 2024-07-19 | End: 2024-08-30

## 2024-07-18 RX ORDER — PANTOPRAZOLE SODIUM 40 MG/1
40 TABLET, DELAYED RELEASE ORAL ONCE
Status: COMPLETED | OUTPATIENT
Start: 2024-07-18 | End: 2024-07-18

## 2024-07-18 RX ORDER — SULFAMETHOXAZOLE AND TRIMETHOPRIM 800; 160 MG/1; MG/1
1 TABLET ORAL 3 TIMES WEEKLY
Qty: 18 TABLET | Refills: 0 | Status: SHIPPED | OUTPATIENT
Start: 2024-07-19 | End: 2024-08-30

## 2024-07-18 RX ORDER — PREDNISONE 20 MG/1
TABLET ORAL
Qty: 74 TABLET | Refills: 0 | Status: SHIPPED | OUTPATIENT
Start: 2024-07-20 | End: 2024-08-31

## 2024-07-18 RX ORDER — LANOLIN ALCOHOL/MO/W.PET/CERES
100 CREAM (GRAM) TOPICAL 3 TIMES DAILY
Qty: 5 TABLET | Refills: 0 | Status: SHIPPED | OUTPATIENT
Start: 2024-07-18 | End: 2024-07-21

## 2024-07-18 RX ORDER — LANOLIN ALCOHOL/MO/W.PET/CERES
100 CREAM (GRAM) TOPICAL 3 TIMES DAILY
Status: DISCONTINUED | OUTPATIENT
Start: 2024-07-18 | End: 2024-07-19 | Stop reason: HOSPADM

## 2024-07-18 ASSESSMENT — PAIN SCALES - PAIN ASSESSMENT IN ADVANCED DEMENTIA (PAINAD)
BODYLANGUAGE: RELAXED
FACIALEXPRESSION: SMILING OR INEXPRESSIVE

## 2024-07-18 ASSESSMENT — COGNITIVE AND FUNCTIONAL STATUS - GENERAL
MOVING TO AND FROM BED TO CHAIR: A LITTLE
CLIMB 3 TO 5 STEPS WITH RAILING: A LITTLE
STANDING UP FROM CHAIR USING ARMS: A LITTLE
DRESSING REGULAR LOWER BODY CLOTHING: A LITTLE
MOBILITY SCORE: 18
TURNING FROM BACK TO SIDE WHILE IN FLAT BAD: A LITTLE
DRESSING REGULAR LOWER BODY CLOTHING: A LITTLE
HELP NEEDED FOR BATHING: A LITTLE
MOVING TO AND FROM BED TO CHAIR: A LITTLE
EATING MEALS: A LITTLE
WALKING IN HOSPITAL ROOM: A LITTLE
PERSONAL GROOMING: A LITTLE
STANDING UP FROM CHAIR USING ARMS: A LITTLE
MOVING FROM LYING ON BACK TO SITTING ON SIDE OF FLAT BED WITH BEDRAILS: A LITTLE
MOVING FROM LYING ON BACK TO SITTING ON SIDE OF FLAT BED WITH BEDRAILS: A LITTLE
CLIMB 3 TO 5 STEPS WITH RAILING: A LITTLE
MOBILITY SCORE: 18
EATING MEALS: A LITTLE
DRESSING REGULAR UPPER BODY CLOTHING: A LITTLE
DRESSING REGULAR UPPER BODY CLOTHING: A LITTLE
TOILETING: A LITTLE
DAILY ACTIVITIY SCORE: 18
WALKING IN HOSPITAL ROOM: A LITTLE
TURNING FROM BACK TO SIDE WHILE IN FLAT BAD: A LITTLE
HELP NEEDED FOR BATHING: A LITTLE
DAILY ACTIVITIY SCORE: 18
TOILETING: A LITTLE
PERSONAL GROOMING: A LITTLE

## 2024-07-18 ASSESSMENT — PAIN SCALES - GENERAL: PAINLEVEL_OUTOF10: 3

## 2024-07-18 ASSESSMENT — PAIN SCALES - WONG BAKER: WONGBAKER_NUMERICALRESPONSE: HURTS LITTLE MORE

## 2024-07-18 NOTE — PROGRESS NOTES
"Jennie Melendez is a 38 y.o. female on day 4 of admission presenting with Seizures (Multi).    Subjective     Patient had one episode 7/17 at 1400 where she \"felt like she was going to have a seizure\". Vitals at this time notable for hypertension to 150/83, sinus tachycardia to 104, tachypnea to 23, and temp 99.3F. When examined, patient had flushed red face. States she had frontal headache with left jaw pain and cold tingling feet. No loss of responsiveness or generalized shaking seen.    States she feels more dizzy this morning. Had few episodes of flushing with 5/10 \"halo\" headache overnight. Headaches lasted 25-30 min, improved with sleep.     Tele NSR overnight, no arrhythmia, few episodes low respiratory rate (RR<10).    Last BM yesterday afternoon.       Objective     Physical Exam   Gen: NAD, resting comfortably in bed with EEG leads on  Resp: normal respiratory effort on RA  CV: RRR, no murmurs/gallops/rubs  Skin: Warm, dry, no rashes on visualized skin  Psych: appropriate mood and affect      Neuro:  CN:  II/III: Pupils round and reactive to light, slight anisocoria with L pupil (5mm->3mm) larger than R pupil (4mm -> 2mm)  III/IV/VI: EOM intact to smooth pursuit with sustained end-gaze nystagmus on both far left and far right gaze, upbeating nystagmus on up-gaze   V: facial sensation intact to light touch  VII: normal and symmetric facial strength, symmetric nasolabial folds  VIII: hearing intact to conversation  IX/X: palate elevates symmetrically  XI: 5/5 strength bilateral SCM and trapezius  XII: Tongue midline  Motor:  5/5 strength in bilateral upper and lower extremities  Cerebellar function:  Finger-nose and heel-shin intact bilaterally    Last Recorded Vitals  Blood pressure 120/72, pulse 81, temperature 36.7 °C (98 °F), resp. rate 23, height 1.626 m (5' 4\"), weight 119 kg (262 lb), last menstrual period 06/30/2024, SpO2 98%.  Intake/Output last 3 Shifts:  No intake/output data " recorded.    Medications   Scheduled medications   enoxaparin, 40 mg, subcutaneous, q24h EVERETT  insulin lispro, 0-10 Units, subcutaneous, TID  methylPREDNISolone sodium succinate (PF), 1,000 mg, intravenous, q18h  pantoprazole, 40 mg, oral, Daily before breakfast  polyethylene glycol, 17 g, oral, Daily  sennosides-docusate sodium, 1 tablet, oral, Nightly  thiamine, 100 mg, oral, TID      PRN medications   PRN medications: acetaminophen **OR** acetaminophen, dextrose, dextrose, glucagon, glucagon, LORazepam, ondansetron     Recent labs  WBC   Date Value Ref Range Status   07/16/2024 5.8 4.4 - 11.3 x10*3/uL Final   07/15/2024 5.2 4.4 - 11.3 x10*3/uL Final   07/14/2024 5.8 4.4 - 11.3 x10*3/uL Final     Hemoglobin   Date Value Ref Range Status   07/16/2024 13.0 12.0 - 16.0 g/dL Final   07/15/2024 12.8 12.0 - 16.0 g/dL Final   07/14/2024 12.3 12.0 - 16.0 g/dL Final     Platelets   Date Value Ref Range Status   07/16/2024 223 150 - 450 x10*3/uL Final   07/15/2024 235 150 - 450 x10*3/uL Final   07/14/2024 218 150 - 450 x10*3/uL Final     Glucose   Date Value Ref Range Status   07/17/2024 181 (H) 74 - 99 mg/dL Final   07/16/2024 89 74 - 99 mg/dL Final   07/15/2024 77 74 - 99 mg/dL Final     Sodium   Date Value Ref Range Status   07/17/2024 137 136 - 145 mmol/L Final   07/16/2024 138 136 - 145 mmol/L Final   07/15/2024 139 136 - 145 mmol/L Final     Potassium   Date Value Ref Range Status   07/17/2024 4.1 3.5 - 5.3 mmol/L Final   07/16/2024 4.3 3.5 - 5.3 mmol/L Final   07/15/2024 4.4 3.5 - 5.3 mmol/L Final     Chloride   Date Value Ref Range Status   07/17/2024 107 98 - 107 mmol/L Final   07/16/2024 107 98 - 107 mmol/L Final   07/15/2024 106 98 - 107 mmol/L Final     Urea Nitrogen   Date Value Ref Range Status   07/17/2024 15 6 - 23 mg/dL Final   07/16/2024 13 6 - 23 mg/dL Final   07/15/2024 12 6 - 23 mg/dL Final     Creatinine   Date Value Ref Range Status   07/17/2024 0.87 0.50 - 1.05 mg/dL Final   07/16/2024 0.84 0.50 -  1.05 mg/dL Final   07/15/2024 0.96 0.50 - 1.05 mg/dL Final     Magnesium   Date Value Ref Range Status   07/17/2024 2.25 1.60 - 2.40 mg/dL Final   07/16/2024 2.20 1.60 - 2.40 mg/dL Final   07/15/2024 2.28 1.60 - 2.40 mg/dL Final     Phosphorus   Date Value Ref Range Status   07/17/2024 2.5 2.5 - 4.9 mg/dL Final     Comment:     The performance characteristics of phosphorus testing in heparinized plasma have been validated by the individual  laboratory site where testing is performed. Testing on heparinized plasma is not approved by the FDA; however, such approval is not necessary.   07/16/2024 3.1 2.5 - 4.9 mg/dL Final     Comment:     The performance characteristics of phosphorus testing in heparinized plasma have been validated by the individual  laboratory site where testing is performed. Testing on heparinized plasma is not approved by the FDA; however, such approval is not necessary.     Calcium   Date Value Ref Range Status   07/17/2024 9.3 8.6 - 10.6 mg/dL Final   07/16/2024 9.1 8.6 - 10.6 mg/dL Final   07/15/2024 9.3 8.6 - 10.6 mg/dL Final     Albumin   Date Value Ref Range Status   07/17/2024 4.0 3.4 - 5.0 g/dL Final   07/16/2024 3.9 3.4 - 5.0 g/dL Final   07/14/2024 4.4 3.4 - 5.0 g/dL Final         Imaging   EEG    Result Date: 7/17/2024  IMPRESSION Impression This vEEG is normal. No epileptiform activity or lateralizing signs seen. A full report will be scanned into the patient's chart at a later time. This report has been interpreted and electronically signed by    MR cervical spine w and wo IV contrast    Result Date: 7/17/2024  Interpreted By:  Matt Bradley,  and Aranza Woods STUDY: MR CERVICAL SPINE W AND WO IV CONTRAST; MR THORACIC SPINE W AND WO IV CONTRAST;  7/16/2024 10:20 pm   INDICATION: Signs/Symptoms:patient presenting with 3 weeks nystagmus/vertigo and new episode syncope vs seizure, MRI brain showed possible demyelinating lesions; Signs/Symptoms:3 week history of vertigo/nystagmus,  MRI brain with possible demyelinating lesions.   COMPARISON: None. MRI of the brain dated 07/16/2024   ACCESSION NUMBER(S): [id number]; [id number]   ORDERING CLINICIAN: AASEF SHAIKH   TECHNIQUE: Sagittal STIR, sagittal T2, sagittal T1, axial T2, axial T1, as well as post gadolinium sagittal axial T1 weighted MRI images through the cervical and thoracic spine were obtained. The patient received 23 mL of Dotarem gadolinium intravenously.   FINDINGS: There is a focus of abnormal intramedullary signal noted within the ventral thoracic spinal cord centered at the T1/2 level measuring approximately 5 mm x 5 mm in transaxial dimensions by 12 mm in the craniocaudal dimension. There is corresponding enhancement on the post gadolinium images. While nonspecific, given the MRI findings of the brain, the finding is most suspicious for a underlying demyelinating process such as multiple sclerosis with the enhancement representing active demyelination.   There is a questionable additional vague focus of increased T2 weighted signal overlying the ventral spinal cord at the C3/4 level without corresponding enhancement raising the possibility of an additional focus of demyelination as well.   The cervical and thoracic vertebral bodies are in anatomic alignment.   There are mild degenerative signal changes noted along endplates at the C5/6 level.   At the C2/3 level,  there is no significant spinal canal stenosis or neuroforaminal stenosis.   At the C3/4 level,  there is a minimal posterior disc/osteophyte complex without significant spinal canal narrowing. Left-sided degenerative uncovertebral joint changes contributing to mild encroachment upon the left neural foramen there is no significant right-sided neural foraminal narrowing   At the C4/5 level,  there is a minimal posterior disc bulge without significant spinal canal or neural foraminal narrowing   At the C5/6 level,  there is a mild posterior disc/osteophyte complex  contributing to mild flattening of the ventral subarachnoid space without spinal cord deformity. There is no significant neural foraminal narrowing.   At the C6/7 level,  there is a mild posterior disc/osteophyte complex contribute to mild flattening of the ventral subarachnoid space without spinal cord deformity. There is no significant neural foraminal narrowing.   At the C7/T1 level,  there is no significant spinal canal stenosis or neuroforaminal stenosis.   At the T1/2 level,  there is no significant spinal canal stenosis or neural foraminal stenosis.   At the T2/3 level, there is no significant spinal canal narrowing.   At the T3/4 level, there is no significant spinal canal narrowing.   At the T4/5 level, there is no significant spinal canal narrowing.   At the T5/6 level, there is no significant spinal canal narrowing.   At the T6/7 level, there is a left paracentral disc herniation measuring 2-3 mm in the AP dimension contributing to effacement of the left ventral subarachnoid space. There is mild flattening of the left ventral thoracic spinal cord which is draped over the disc herniation.   At the T7/8 level, there is a left-sided disc and disc/osteophyte complex measuring 2 mm in the AP dimension contributing to effacement of the left ventral subarachnoid space. There is minimal flattening of the left ventral thoracic spinal cord which is draped over the disc and/or disc/osteophyte complex.   At the T8/9 level, there is a minimal left-sided disc and disc/osteophyte complex without significant spinal canal narrowing.   At the T9/10 level, there is a minimal posterior disc/osteophyte complex without significant spinal canal narrowing.   At the T10/11 level, there is no significant spinal canal narrowing.   At the T11/12 level, there is no significant spinal canal narrowing.   At the T12/L1 level, there is no significant spinal canal narrowing.       There is a focus of abnormal intramedullary signal noted  within the ventral thoracic spinal cord centered at the T1/2 level measuring approximately 5 mm x 5 mm in transaxial dimensions by 12 mm in the craniocaudal dimension. There is corresponding enhancement on the post gadolinium images. While nonspecific, given the MRI findings of the brain, the finding is most suspicious for a underlying demyelinating process such as multiple sclerosis with the enhancement representing active demyelination.   There is a questionable additional vague focus of increased T2 signal overlying the ventral spinal cord at the C3/4 level without corresponding enhancement raising the possibility of an additional focus of demyelination as well.   There is multilevel spondylosis within the cervical and thoracic region as described above.   I personally reviewed the images/study and I agree with the findings as stated by Dr. Chuck Cobian M.D. This study was interpreted at [facility], [place], [place].   MACRO: None.   Signed by: Matt Bradley 7/17/2024 6:52 AM Dictation workstation:   [id number]    MR thoracic spine w and wo IV contrast    Result Date: 7/17/2024  Interpreted By:  Matt Bradley,  and Aranza Woods STUDY: MR CERVICAL SPINE W AND WO IV CONTRAST; MR THORACIC SPINE W AND WO IV CONTRAST;  7/16/2024 10:20 pm   INDICATION: Signs/Symptoms:patient presenting with 3 weeks nystagmus/vertigo and new episode syncope vs seizure, MRI brain showed possible demyelinating lesions; Signs/Symptoms:3 week history of vertigo/nystagmus, MRI brain with possible demyelinating lesions.   COMPARISON: None. MRI of the brain dated 07/16/2024   ACCESSION NUMBER(S): [id number]; [id number]   ORDERING CLINICIAN: AASEF SHAIKH   TECHNIQUE: Sagittal STIR, sagittal T2, sagittal T1, axial T2, axial T1, as well as post gadolinium sagittal axial T1 weighted MRI images through the cervical and thoracic spine were obtained. The patient received 23 mL of Dotarem gadolinium  intravenously.   FINDINGS: There is a focus of abnormal intramedullary signal noted within the ventral thoracic spinal cord centered at the T1/2 level measuring approximately 5 mm x 5 mm in transaxial dimensions by 12 mm in the craniocaudal dimension. There is corresponding enhancement on the post gadolinium images. While nonspecific, given the MRI findings of the brain, the finding is most suspicious for a underlying demyelinating process such as multiple sclerosis with the enhancement representing active demyelination.   There is a questionable additional vague focus of increased T2 weighted signal overlying the ventral spinal cord at the C3/4 level without corresponding enhancement raising the possibility of an additional focus of demyelination as well.   The cervical and thoracic vertebral bodies are in anatomic alignment.   There are mild degenerative signal changes noted along endplates at the C5/6 level.   At the C2/3 level,  there is no significant spinal canal stenosis or neuroforaminal stenosis.   At the C3/4 level,  there is a minimal posterior disc/osteophyte complex without significant spinal canal narrowing. Left-sided degenerative uncovertebral joint changes contributing to mild encroachment upon the left neural foramen there is no significant right-sided neural foraminal narrowing   At the C4/5 level,  there is a minimal posterior disc bulge without significant spinal canal or neural foraminal narrowing   At the C5/6 level,  there is a mild posterior disc/osteophyte complex contributing to mild flattening of the ventral subarachnoid space without spinal cord deformity. There is no significant neural foraminal narrowing.   At the C6/7 level,  there is a mild posterior disc/osteophyte complex contribute to mild flattening of the ventral subarachnoid space without spinal cord deformity. There is no significant neural foraminal narrowing.   At the C7/T1 level,  there is no significant spinal canal  stenosis or neuroforaminal stenosis.   At the T1/2 level,  there is no significant spinal canal stenosis or neural foraminal stenosis.   At the T2/3 level, there is no significant spinal canal narrowing.   At the T3/4 level, there is no significant spinal canal narrowing.   At the T4/5 level, there is no significant spinal canal narrowing.   At the T5/6 level, there is no significant spinal canal narrowing.   At the T6/7 level, there is a left paracentral disc herniation measuring 2-3 mm in the AP dimension contributing to effacement of the left ventral subarachnoid space. There is mild flattening of the left ventral thoracic spinal cord which is draped over the disc herniation.   At the T7/8 level, there is a left-sided disc and disc/osteophyte complex measuring 2 mm in the AP dimension contributing to effacement of the left ventral subarachnoid space. There is minimal flattening of the left ventral thoracic spinal cord which is draped over the disc and/or disc/osteophyte complex.   At the T8/9 level, there is a minimal left-sided disc and disc/osteophyte complex without significant spinal canal narrowing.   At the T9/10 level, there is a minimal posterior disc/osteophyte complex without significant spinal canal narrowing.   At the T10/11 level, there is no significant spinal canal narrowing.   At the T11/12 level, there is no significant spinal canal narrowing.   At the T12/L1 level, there is no significant spinal canal narrowing.       There is a focus of abnormal intramedullary signal noted within the ventral thoracic spinal cord centered at the T1/2 level measuring approximately 5 mm x 5 mm in transaxial dimensions by 12 mm in the craniocaudal dimension. There is corresponding enhancement on the post gadolinium images. While nonspecific, given the MRI findings of the brain, the finding is most suspicious for a underlying demyelinating process such as multiple sclerosis with the enhancement representing active  demyelination.   There is a questionable additional vague focus of increased T2 signal overlying the ventral spinal cord at the C3/4 level without corresponding enhancement raising the possibility of an additional focus of demyelination as well.   There is multilevel spondylosis within the cervical and thoracic region as described above.   I personally reviewed the images/study and I agree with the findings as stated by Dr. Chuck Cobian M.D. This study was interpreted at [facility], [place], [place].   MACRO: None.   Signed by: Matt Bradley 7/17/2024 6:52 AM Dictation workstation:   [id number]    MR orbit w and wo IV contrast    Result Date: 7/17/2024  Interpreted By:  Matt Bradley,  and Aranza Woods STUDY: MR BRAIN W AND WO IV CONTRAST; MR ORBIT W AND WO IV CONTRAST; 7/16/2024 10:20 pm   INDICATION: Signs/Symptoms:patient presenting with 3 weeks nystagmus/vertigo and new episode syncope vs seizure, MRI brain showed possible demyelinating lesions, need thin cuts to assess for brainstem pathology; Signs/Symptoms:3 weeks constant vertigo with nystagmus.   COMPARISON: None. MRI brain on 07/15/2024.   ACCESSION NUMBER(S): [id number]; [id number]   ORDERING CLINICIAN: AASEF SHAIKH   TECHNIQUE: Axial diffusion, axial T2, axial FLAIR, axial gradient echo T2, axial T1, post gadolinium volumetric T1, as well as post gadolinium axial T1 weighted MRI images of the brain were obtained. High-resolution coronal T2 as well as pre and post gadolinium coronal and axial T1 weighted MRI images through the orbits were obtained. The patient received  23 mL of  Dotarem gadolinium intravenously.   FINDINGS: The ventricular system remains nondilated.   There is again evidence of scattered nonspecific white matter changes within the cerebral hemispheres bilaterally unchanged in appearance when compared with the prior study dated 07/15/2024. Again, a few of the periventricular white matter changes  demonstrate a somewhat perpendicular orientation to the lateral ventricles a pattern which can be seen with underlying demyelinating processes such as multiple sclerosis.   The post gadolinium images again demonstrate a subcentimeter focus of enhancement corresponding to a focal area of nonspecific white matter change within the left parietal lobe. There is mild increased diffusion signal corresponding to this focus of white matter change and enhancement as seen on axial diffusion slice 58 of 78 without corresponding diminished signal on the ADC map. The constellation of MRI findings is most suspicious for a focus of active demyelination. Other considerations such as a small focus of enhancing evolving subacute infarction is felt less likely but not entirely excluded. Clinical correlation is recommended.   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   The MRI of the orbits demonstrates no abnormal intraorbital mass lesion. The extraocular muscles are within normal limits in size and configuration. The globes are intact. No signal abnormality or abnormal enhancement is identified along the optic nerves.   The visualized paranasal sinuses and mastoid air cells are clear.       There is again evidence of scattered nonspecific white matter changes within the cerebral hemispheres bilaterally unchanged in appearance when compared with the prior study dated 07/15/2024. Again, a few of the periventricular white matter changes demonstrate a somewhat perpendicular orientation to the lateral ventricles a pattern which can be seen with underlying demyelinating processes such as multiple sclerosis.   The post gadolinium images again demonstrate a subcentimeter focus of enhancement corresponding to a focal area of nonspecific white matter change within the left parietal lobe. There is mild increased diffusion signal corresponding to this focus of white matter change and enhancement as seen on axial diffusion slice 58 of 78 without  corresponding diminished signal on the ADC map. The constellation of MRI findings is most suspicious for a focus of active demyelination. Other considerations such as a small focus of enhancing evolving subacute infarction is felt less likely but not entirely excluded. Clinical correlation is recommended.   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   The MRI of the orbits demonstrates no abnormal intraorbital mass lesion. The extraocular muscles are within normal limits in size and configuration. The globes are intact. No signal abnormality or abnormal enhancement is identified along the optic nerves.   I personally reviewed the images/study and I agree with the findings as stated by Dr. Chuck Cobian M.D. This study was interpreted at [place], [place].   MACRO: None.   Signed by: Matt Bradley 7/17/2024 6:27 AM Dictation workstation:   [id number]    MR brain w and wo IV contrast    Result Date: 7/17/2024  Interpreted By:  Matt Bradley  and Aranza Woods STUDY: MR BRAIN W AND WO IV CONTRAST; MR ORBIT W AND WO IV CONTRAST; 7/16/2024 10:20 pm   INDICATION: Signs/Symptoms:patient presenting with 3 weeks nystagmus/vertigo and new episode syncope vs seizure, MRI brain showed possible demyelinating lesions, need thin cuts to assess for brainstem pathology; Signs/Symptoms:3 weeks constant vertigo with nystagmus.   COMPARISON: None. MRI brain on 07/15/2024.   ACCESSION NUMBER(S): [id number]; [id number]   ORDERING CLINICIAN: AASEF SHAIKH   TECHNIQUE: Axial diffusion, axial T2, axial FLAIR, axial gradient echo T2, axial T1, post gadolinium volumetric T1, as well as post gadolinium axial T1 weighted MRI images of the brain were obtained. High-resolution coronal T2 as well as pre and post gadolinium coronal and axial T1 weighted MRI images through the orbits were obtained. The patient received  23 mL of  Dotarem gadolinium intravenously.   FINDINGS: The ventricular system  remains nondilated.   There is again evidence of scattered nonspecific white matter changes within the cerebral hemispheres bilaterally unchanged in appearance when compared with the prior study dated 07/15/2024. Again, a few of the periventricular white matter changes demonstrate a somewhat perpendicular orientation to the lateral ventricles a pattern which can be seen with underlying demyelinating processes such as multiple sclerosis.   The post gadolinium images again demonstrate a subcentimeter focus of enhancement corresponding to a focal area of nonspecific white matter change within the left parietal lobe. There is mild increased diffusion signal corresponding to this focus of white matter change and enhancement as seen on axial diffusion slice 58 of 78 without corresponding diminished signal on the ADC map. The constellation of MRI findings is most suspicious for a focus of active demyelination. Other considerations such as a small focus of enhancing evolving subacute infarction is felt less likely but not entirely excluded. Clinical correlation is recommended.   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   The MRI of the orbits demonstrates no abnormal intraorbital mass lesion. The extraocular muscles are within normal limits in size and configuration. The globes are intact. No signal abnormality or abnormal enhancement is identified along the optic nerves.   The visualized paranasal sinuses and mastoid air cells are clear.       There is again evidence of scattered nonspecific white matter changes within the cerebral hemispheres bilaterally unchanged in appearance when compared with the prior study dated 07/15/2024. Again, a few of the periventricular white matter changes demonstrate a somewhat perpendicular orientation to the lateral ventricles a pattern which can be seen with underlying demyelinating processes such as multiple sclerosis.   The post gadolinium images again demonstrate a subcentimeter focus  of enhancement corresponding to a focal area of nonspecific white matter change within the left parietal lobe. There is mild increased diffusion signal corresponding to this focus of white matter change and enhancement as seen on axial diffusion slice 58 of 78 without corresponding diminished signal on the ADC map. The constellation of MRI findings is most suspicious for a focus of active demyelination. Other considerations such as a small focus of enhancing evolving subacute infarction is felt less likely but not entirely excluded. Clinical correlation is recommended.   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   The MRI of the orbits demonstrates no abnormal intraorbital mass lesion. The extraocular muscles are within normal limits in size and configuration. The globes are intact. No signal abnormality or abnormal enhancement is identified along the optic nerves.   I personally reviewed the images/study and I agree with the findings as stated by Dr. Chuck Cobian M.D. This study was interpreted at [place], [place].   MACRO: None.   Signed by: Matt Bradley 7/17/2024 6:27 AM Dictation workstation:   [id number]               Assessment/Plan   Principal Problem:    Seizures (Multi)    Ms. Melendez is a 39yo woman with no significant PMHx who presented to  on 7/14 with c/f first-time seizure. 3 weeks prior to presentation the patient began experiencing constant vertigo and dizziness. Then on 7/14 the patient had sudden-onset flushing/sensation of heat, lightheadedness, and dizziness followed by collapse with brief LOC (no post-ictal confusion/lethargy) that was then followed by generalized BUE shaking that progressed to BLE shaking. At OSH the patient was noted to have R gaze deviation and L sided weakness c/f Demetrius's paralysis so she received 2x 1 mg ativan and was loaded with 2400 mg Keppra. The weakness improved over several hours. Patient had similar episode on 7/16 after  walking with PT, no LOC but did have urinary incontinence. The semiology (autonomic -> nystagmoid -> generalized shaking with intact consciousness) is not c/w epileptic event. Now on vEEG to capture future events. Also on telemetry to monitor for any intermittent arrhythmia that could cause cardiogenic syncope.     MRIb with few punctate T2/FLAIR white matter hyperintensities including periventricular lesions and few enhancing foci in the L posterior parietal and occipital lobes. MRI C/T-spine showed enhancing intramedullary STIR signal at T1-2 level and questionable focus of T2 signal at C3-4 without enhancement.      Per Polk criteria she meets the criteria for a diagnosis of multiple sclerosis (1 clinical attack with >2 lesions on MRI and dissemination in time on MRI (mix of enhancing and nonenhancing lesions)).      Patient will receive IVMP dose 4/5 today.     #multiple sclerosis  #vertigo with horizontal and vertical nystagmus  ::famhx lupus in mother and sister  ::Utox negative  ::RF negative  ::7/16 MRI orbit unremarkable  ::7/16 MRIb with scattered white matter changes bilaterally including several periventricular lesions and enhancing white matter lesion in L parietal lobe  ::7/16 MRI C/T-spine with enhancing intramedullary lesion at T1-2 level and questionable focus of T2 hyperintensity at C3-4 without enhancement  ::folate and vitB12 wnl  - appreciate ophthalmology recs: started thiamine 100 mg PO TID x3days  - will receive dose 4/5 IVMP today   [ ] ANETA with reflex pending  [ ] vitB1 pending     #non-epileptic generalized shaking vs epileptic events  ::s/p 2400 mg keppra load on 7/14  ::semiology inconsistent with an epileptic event (autonomic -> nystagmoid -> generalized shaking with intact consciousness)   ::7/15 routine EEG nl  ::orthostatic vitals nl (140/87 P82 lying down -> 135/87 P86 standing)  ::s/p 48 hours telemetry without any arrhythmia  - no current need for AEDs  [ ] continue telemetry  to monitor for intermittent arrhythmia/cardiogenic syncope  [ ] will discontinue vEEG today, did capture several episodes of flushing/HA without unresponsiveness or generalized shaking, pending final EEG read     #dispo  - immunotherapy labs sent  - neuro-immunology follow-up referral placed  - plan for 6 week prednisone taper + PPI + MWF bactrim     F: PRN  E: PRN  N: Regular  A: pIV  DVT ppx: lovenox     Code: Full Code  NOK:   Jim Melendez (Spouse)  [phone] (Home Phone)           Monse Horan MD  Neurology, PGY-1

## 2024-07-18 NOTE — CARE PLAN
The patient's goals for the shift include      The clinical goals for the shift include Pt will remain safe throughout shift.    Over the shift, the patient did not make progress toward the following goals. Barriers to progression include . Recommendations to address these barriers include .

## 2024-07-19 ENCOUNTER — PHARMACY VISIT (OUTPATIENT)
Dept: PHARMACY | Facility: CLINIC | Age: 39
End: 2024-07-19
Payer: COMMERCIAL

## 2024-07-19 VITALS
TEMPERATURE: 97.2 F | SYSTOLIC BLOOD PRESSURE: 125 MMHG | HEART RATE: 62 BPM | HEIGHT: 64 IN | RESPIRATION RATE: 18 BRPM | WEIGHT: 262 LBS | BODY MASS INDEX: 44.73 KG/M2 | DIASTOLIC BLOOD PRESSURE: 78 MMHG | OXYGEN SATURATION: 94 %

## 2024-07-19 LAB
CD19 CELLS # BLD: 0.16 X10E9/L
CD19 CELLS NFR BLD: 15 %
CD19+CD24++CD38++%: 4.7 %
CD19+CD24-CD38++%: 0.7 %
CD19+CD27+IGD+%: 18.8 %
CD19+CD27+IGD-%: 7.8 %
CD19+CD27-IGD+%: 69.9 %
FLOW CYTOMETRY SPECIALIST REVIEW: ABNORMAL
GLUCOSE BLD MANUAL STRIP-MCNC: 136 MG/DL (ref 74–99)
LYMPHOCYTES # SPEC AUTO: 1.06 X10*3/UL
NIL(NEG) CONTROL SPOT COUNT: NORMAL
PANEL A SPOT COUNT: 0
PANEL B SPOT COUNT: 0
PATH REVIEW, B CELL PHENOTYPING, EXTENDED: ABNORMAL
POS CONTROL SPOT COUNT: NORMAL
SARS-COV-2 RNA RESP QL NAA+PROBE: NOT DETECTED
T-SPOT. TB INTERPRETATION: NEGATIVE

## 2024-07-19 PROCEDURE — 87635 SARS-COV-2 COVID-19 AMP PRB: CPT

## 2024-07-19 PROCEDURE — 2500000004 HC RX 250 GENERAL PHARMACY W/ HCPCS (ALT 636 FOR OP/ED)

## 2024-07-19 PROCEDURE — 87632 RESP VIRUS 6-11 TARGETS: CPT

## 2024-07-19 PROCEDURE — 2500000001 HC RX 250 WO HCPCS SELF ADMINISTERED DRUGS (ALT 637 FOR MEDICARE OP)

## 2024-07-19 PROCEDURE — 82947 ASSAY GLUCOSE BLOOD QUANT: CPT

## 2024-07-19 PROCEDURE — 99232 SBSQ HOSP IP/OBS MODERATE 35: CPT

## 2024-07-19 RX ORDER — ERGOCALCIFEROL 1.25 MG/1
50000 CAPSULE ORAL
Qty: 52 CAPSULE | Refills: 0 | Status: SHIPPED | OUTPATIENT
Start: 2024-07-21 | End: 2025-07-21

## 2024-07-19 ASSESSMENT — PAIN SCALES - GENERAL: PAINLEVEL_OUTOF10: 3

## 2024-07-19 NOTE — DISCHARGE SUMMARY
Discharge Diagnosis  Seizures (Multi)    Issues Requiring Follow-Up  - Follow-up with PCP if Covid/viral panel positive and/or acute pharyngitis does not resolve  - Follow-up with Neuro-immunology to start disease-modifying therapy for new diagnosis of MS (pre-immunotherapy labs were ordered)  - Follow-up with psychiatry/PCP to manage new prescription of sertraline  - Follow-up with ophthalmology    Test Results Pending At Discharge  Pending Labs       Order Current Status    Respiratory Viral Panel Collected (07/19/24 2582)    MARION Virus Antibody with Reflex To Inhibition Assay In process    Sars-CoV-2 PCR In process    T-Spot TB In process    Vitamin B1, whole blood In process    Vitamin D 1,25 Dihydroxy (for eval of hypercalcemia) In process            Hospital Course  Ms. Jennie Melendez is a 37yo woman with no significant PMHx who presented to  on 7/14 with c/f first-time seizure. 3 weeks prior to presentation the patient began experiencing constant vertigo and dizziness. Then on 7/14 the patient had sudden-onset flushing, lightheadedness, and dizziness followed by collapse with brief LOC (no post-ictal confusion/lethargy) that was then followed by generalized BUE shaking that progressed to BLE shaking with intact consciousness. At OSH the patient was noted to have R gaze deviation and L sided weakness c/f Demetrius's paralysis so she received 2x 1 mg ativan and was loaded with 2400 mg Keppra. The weakness improved over several hours. Routine EEG (done 12+ hours after the episode) was normal, no epileptiform discharges or lateralizing signs were seen. Given EEG wnl and semiology most c/w non-epileptic event, the patient was not started on AEDs.    On exam the patient was found to have sustained end-gaze nystagmus on R and L gaze, as well as upbeat nystagmus on up gaze. She also had 3+ patellar reflexes bilaterally with downgoing toes on plantar stimulation.     MRIb showed scattered T2/FLAIR white matter  hyperintensities with several periventricular lesions and few enhancing foci in the L posterior parietal and occipital lobes. MRI C-/T-spine showed a contrast enhancing intramedullary T2 hyperintensity at T1-2 level, as well as questionable focus of T2 signal at C3-C4 without enhancement, and multilevel spondylosis at C3-C6 and T6-10. MRI orbits was unremarkable.    Based on MRI findings, patient met Polk criteria for a new diagnosis of multiple sclerosis and was treated with 1g IVMP Q18H x5 doses (7/16-7/19). Patient has significant family hx of autoimmune disease (AMAURI and lupus in sister, lupus in mother), so RF and ANETA were checked (both negative). Ophthalmology was consulted, who recommended thiamine 100 mg PO TID for 2-3 days.      On 7/16 the patient had another episode of autonomic -> nystagmoid -> generalized tremulous shaking that was precipitated by walking with PT. She had intact consciousness, could squeeze hands/follow commands but not speak during the event. She had urinary incontinence and post-event lethargy. Event semiology was thought to be non-epileptic, possibly representing cardiogenic syncope vs vasovagal. Orthostatic vitals were normal (140/87 P82 lying down -> 135/87 P86 standing). Telemetry was started with no arrhythmia seen over 3 days. vEEG was obtained for 24 hours with no further events captured. The patient did have an episode of autonomic symptoms (flushing, sinus tachycardia, tachypnea, and hypertension) with no EEG correlate. This episode did not progress to a staring spell, nystagmoid eye movements, loss of ability to speak, or generalized shaking. Since patient's EEG, telemetry, and orthostatic vitals were normal, it was thought that the patient's episodes were likely panic attacks. The patient was provided with a prescription of 25 mg sertraline daily (with instructions to increase to 50 mg daily after 1 week) and advised to follow-up outpatient with either PCP or  psychiatrist within 2 months. She was also advised to consider cognitive behavioral therapy.    She was seen by PT/OT and had no homegoing needs. She was discharged home on 7/19 after receiving fifth dose of IVMP with improvement in her nystagmus and vertigo.     Referral for follow-up with neuro-immunology was placed and pre-immunotherapy labs were sent. Lipid panel (total cholesterol 184, HDL 58.6, LDL 97, ) and 25-OH vitamin D were normal. Sars-Cov-2 nucleocapsid IgG, HBV Core Ab, HBV Surface Ag, HBV Surface Ab, HCV RNA, HCV Ab, syphilis screen, and HIV were all negative. VZV IgG was elevated. IgG and IgA were normal, but IgM was mildly elevated at 242.    On day of discharge, the patient developed a new sore throat without fever, cough, or SOB. Covid PCR and respiratory virus panels were sent. The patient was advised to follow-up with her PCP if her test results were positive or her symptoms did not resolve.    Outpatient referrals for follow-up were made with neuro-immunology and ophthalmology. Patient was advised to follow-up with PCP and psychiatrist within 2 months to discuss adjustment of sertraline dose.    Pending labs at time of discharge  - JCV antibody   - Tspot  - B cell phenotyping  - Vitamin D 1,25-dihydroxy  - Vitamin B1  - Covid PCR  - Respiratory virus panel    New medications at discharge:  - Thiamine (vitamin B1) 100 mg three times daily (last dose Saturday night)  - Vitamin D 50,000IU once per week  - Sertraline (zoloft) 25 mg daily for 1 week, then increase to 50 mg daily  - Pantoprazole (protonix) 40 mg daily while taking prednisone (7/20 - 8/30)  - Bactrim every Monday, Wednesday, and Friday while taking prednisone (7/20 - 8/30)  6 week prednisone taper:  - 60 mg daily for 1 week (7/20 - 7/26)  - 50 mg daily for 1 week (7/27 - 8/2)  - 40 mg daily for 1 week (8/3 - 8/9)  - 30 mg daily for 1 week (8/10 - 8/16)  - 20 mg daily for 1 week (8/17 - 8/23)  - 10 mg daily for 1 week  (8/24-8/30)                Pertinent Physical Exam At Time of Discharge  Gen: NAD, resting comfortably in bed   HEENT: oral mucous membranes moist, no pharyngeal erythema observed  Resp: normal respiratory effort on RA  CV: RRR, no murmurs/gallops/rubs  Skin: Warm, dry, no rashes on visualized skin  Psych: appropriate mood and affect      Neuro:  CN:  II/III: PERRL  III/IV/VI: EOM intact to smooth pursuit with nonsustained end-gaze nystagmus on both far left and far right gaze, subtle upbeating nystagmus on up-gaze   VII: normal and symmetric facial strength, symmetric nasolabial folds  VIII: hearing intact to conversation  IX/X: palate elevates symmetrically  XI: 5/5 strength bilateral SCM and trapezius  XII: Tongue midline  Motor:  5/5 strength in bilateral upper and lower extremities    Home Medications     Medication List      START taking these medications     ergocalciferol 1.25 MG (76917 UT) capsule; Commonly known as: Vitamin   D-2; Take 1 capsule (50,000 Units) by mouth 1 (one) time per week.; Start   taking on: July 21, 2024   pantoprazole 40 mg EC tablet; Commonly known as: ProtoNix; Take 1 tablet   (40 mg) by mouth once daily in the morning. Take before meals. Do not   crush, chew, or split.   predniSONE 20 mg tablet; Commonly known as: Deltasone; Take 3 tablets   (60 mg) by mouth once daily for 7 days, THEN 2.5 tablets (50 mg) once   daily for 7 days, THEN 2 tablets (40 mg) once daily for 7 days, THEN 1.5   tablets (30 mg) once daily for 7 days, THEN 1 tablet (20 mg) once daily   for 7 days, THEN 0.5 tablets (10 mg) once daily for 7 days. Do not fill   before July 20, 2024.; Start taking on: July 20, 2024   sertraline 25 mg tablet; Commonly known as: Zoloft; Take 1 tablet (25   mg) by mouth once daily.   sulfamethoxazole-trimethoprim 800-160 mg tablet; Commonly known as:   Bactrim DS; Take 1 tablet by mouth 3 (three) times a week for 18 doses.   Take every Monday, Wednesday, and Friday while taking  prednisone.   thiamine 100 mg tablet; Commonly known as: Vitamin B-1; Take 1 tablet   (100 mg) by mouth 3 times a day for 5 doses.     STOP taking these medications     ondansetron ODT 4 mg disintegrating tablet; Commonly known as:   Zofran-ODT       Outpatient Follow-Up  Referrals were placed for:  - Follow-up with PCP  - Follow-up with Neuro-immunology  - Follow-up with psychiatry  - Follow-up with ophthalmology    Monse Horan MD  Neurology PGY1

## 2024-07-20 LAB — 1,25(OH)2D SERPL-MCNC: 80.6 PG/ML (ref 19.9–79.3)

## 2024-07-21 DIAGNOSIS — G35 MULTIPLE SCLEROSIS (MULTI): Primary | ICD-10-CM

## 2024-07-23 ENCOUNTER — LAB (OUTPATIENT)
Dept: LAB | Facility: LAB | Age: 39
End: 2024-07-23
Payer: COMMERCIAL

## 2024-07-23 DIAGNOSIS — G35 MULTIPLE SCLEROSIS (MULTI): ICD-10-CM

## 2024-07-23 LAB — VIT B1 PYROPHOSHATE BLD-SCNC: 131 NMOL/L (ref 70–180)

## 2024-07-23 PROCEDURE — 36415 COLL VENOUS BLD VENIPUNCTURE: CPT

## 2024-07-23 PROCEDURE — 86053 AQAPRN-4 ANTB FLO CYTMTRY EA: CPT

## 2024-07-23 PROCEDURE — 86256 FLUORESCENT ANTIBODY TITER: CPT

## 2024-07-23 PROCEDURE — 86363 MOG-IGG1 ANTB FLO CYTMTRY EA: CPT

## 2024-07-23 NOTE — DOCUMENTATION CLARIFICATION NOTE
"    PATIENT:               RAEGAN MARTINEZ  ACCT #:                  [id number]  MRN:                       [id number]  :                       1985  ADMIT DATE:       2024 10:19 PM  DISCH DATE:        2024 12:15 PM  RESPONDING PROVIDER #:        [id number]          PROVIDER RESPONSE TEXT:    Pt with panic disorder. Epileptic seizure not present.    CDI QUERY TEXT:    Clarification      Instruction:    Based on your assessment of the patient and the clinical information, please provide the requested documentation by clicking on the appropriate radio button and enter any additional information if prompted.    Question: Can a diagnosis be further clarified for the above clinical information    When answering this query, please exercise your independent professional judgment. The fact that a question is being asked, does not imply that any particular answer is desired or expected.    The patient's clinical indicators include:  Clinical Information: 37yo woman with no significant PMHx who presented to  on  with c/f first-time seizure.    Clinical Indicators:   EEG   \"EEG is normal \"     DC Summary by Dr. Horan  \"Seizures\"  \"Routine EEG done 12+ hours after the episode was normal, no epileptiform discharges or lateralizing signs were seen. Given EEG wnl and semiology most c/w non-epileptic event, the patient was not started on AEDs.\" \"Based on MRI findings, patient met Polk criteria for a new diagnosis of multiple sclerosis and was treated with 1g IVMP Q18H x5 \"  \"Since patient's EEG, telemetry, and orthostatic vitals were normal, it was thought that the patient's episodes were likely panic attacks\" \"The patient was provided with a prescription of 25 mg sertraline daily with instructions to increase to 50 mg daily after 1 week and advised to follow-up outpatient with either PCP or psychiatrist within 2 months. She was also advised to consider cognitive behavioral therapy.\"    Treatment: EEG, " IVMP    Risk Factors:  MS  Options provided:  -- Epileptic Seizure due to MS  -- Epileptic Seizure due to Panic attacks  -- PT with  PNEA. Epileptic seizure not present  -- Pt with PNES.  Epileptic seizure not present  -- Other - I will add my own diagnosis  -- Refer to Clinical Documentation Reviewer    Query created by: Caroline Forrest on 7/23/2024 2:04 PM      Electronically signed by:  JESICA FARIAS MD 7/23/2024 4:11 PM

## 2024-07-24 LAB
ADENOVIRUS RVP, VIRC: NOT DETECTED
ENTEROVIRUS/RHINOVIRUS RVP, VIRC: NOT DETECTED
HUMAN BOCAVIRUS RVP, VIRC: NOT DETECTED
HUMAN CORONAVIRUS RVP, VIRC: NOT DETECTED
INFLUENZA A , VIRC: NOT DETECTED
INFLUENZA A H1N1-09 , VIRC: NOT DETECTED
INFLUENZA B PCR, VIRC: NOT DETECTED
METAPNEUMOVIRUS , VIRC: NOT DETECTED
PARAINFLUENZA PCR, VIRC: NOT DETECTED
RSV PCR, RVP, VIRC: NOT DETECTED

## 2024-07-26 LAB
JC VIRUS AB (SJC): ABNORMAL
JCV INDEX VALUE (SJC): 0.28
JCV INHIBITION ASSAY (SJC): NORMAL

## 2024-07-29 LAB
AQP4 H2O CHANNEL IGG SERPL QL: NEGATIVE
IMMUNOLOGIST REVIEW: NORMAL
MOG IGG1 SERPL QL FC: NEGATIVE

## 2024-10-01 ENCOUNTER — APPOINTMENT (OUTPATIENT)
Dept: OPHTHALMOLOGY | Facility: CLINIC | Age: 39
End: 2024-10-01
Payer: COMMERCIAL

## 2025-04-02 ENCOUNTER — OFFICE VISIT (OUTPATIENT)
Dept: URGENT CARE | Facility: CLINIC | Age: 40
End: 2025-04-02
Payer: COMMERCIAL

## 2025-04-02 VITALS
HEIGHT: 64 IN | TEMPERATURE: 98.3 F | WEIGHT: 275 LBS | OXYGEN SATURATION: 99 % | RESPIRATION RATE: 16 BRPM | HEART RATE: 72 BPM | DIASTOLIC BLOOD PRESSURE: 87 MMHG | SYSTOLIC BLOOD PRESSURE: 139 MMHG | BODY MASS INDEX: 46.95 KG/M2

## 2025-04-02 DIAGNOSIS — R41.89 COGNITIVE CHANGE: Primary | ICD-10-CM

## 2025-04-02 LAB
POC APPEARANCE, URINE: CLEAR
POC BILIRUBIN, URINE: NEGATIVE
POC BLOOD, URINE: NEGATIVE
POC COLOR, URINE: YELLOW
POC GLUCOSE, URINE: NEGATIVE MG/DL
POC KETONES, URINE: NEGATIVE MG/DL
POC LEUKOCYTES, URINE: NEGATIVE
POC NITRITE,URINE: NEGATIVE
POC PH, URINE: 6.5 PH
POC PROTEIN, URINE: NEGATIVE MG/DL
POC SPECIFIC GRAVITY, URINE: 1.01
POC UROBILINOGEN, URINE: 0.2 EU/DL

## 2025-04-02 PROCEDURE — 81002 URINALYSIS NONAUTO W/O SCOPE: CPT | Performed by: NURSE PRACTITIONER

## 2025-04-02 PROCEDURE — 99213 OFFICE O/P EST LOW 20 MIN: CPT | Performed by: NURSE PRACTITIONER

## 2025-04-02 RX ORDER — DEXTROAMPHETAMINE SACCHARATE, AMPHETAMINE ASPARTATE, DEXTROAMPHETAMINE SULFATE AND AMPHETAMINE SULFATE 2.5; 2.5; 2.5; 2.5 MG/1; MG/1; MG/1; MG/1
1 TABLET ORAL
COMMUNITY
Start: 2025-03-20

## 2025-04-02 RX ORDER — DESVENLAFAXINE SUCCINATE 25 MG/1
2 TABLET, EXTENDED RELEASE ORAL
COMMUNITY
Start: 2025-03-21

## 2025-04-02 RX ORDER — ARMODAFINIL 150 MG/1
1 TABLET ORAL
COMMUNITY
Start: 2024-11-07

## 2025-04-02 RX ORDER — ACETAMINOPHEN 500 MG
5000 TABLET ORAL
COMMUNITY
Start: 2024-07-26 | End: 2025-07-26

## 2025-04-02 NOTE — PROGRESS NOTES
39 y.o. female presents for evaluation of possible UTI.  Says she has a history of MS and has been noticing progressive mental fog several days.  States her neurologist would like her to have a urinalysis completed.  Denies fever, abdominal pain, nausea, vomiting, diarrhea, dysuria, vaginal discharge, flank pain or any other associated symptom or complaint.  No OTC meds for management.  No other complaints.      Vitals:    25 1433   BP: 139/87   Pulse: 72   Resp: 16   Temp: 36.8 °C (98.3 °F)   SpO2: 99%       Allergies   Allergen Reactions    Keppra [Levetiracetam] Dizziness       Medication Documentation Review Audit       Reviewed by Chelsie Castillo MA (Medical Assistant) on 25 at 1426      Medication Order Taking? Sig Documenting Provider Last Dose Status   amphetamine-dextroamphetamine (Adderall) 10 mg tablet [id number] Yes Take 1 tablet (10 mg) by mouth every 12 hours. Historical Provider, MD  Active   armodafinil (Nuvigil) 150 mg tablet [id number] Yes Take 1 tablet (150 mg) by mouth every 12 hours. Historical Provider, MD  Active   cholecalciferol (Vitamin D-3) 5,000 Units tablet [id number] Yes Take 1 tablet (5,000 Units) by mouth once daily. Historical Provider, MD  Active   desvenlafaxine succinate (Pristiq) 25 mg 24 hour tablet [id number] Yes Take 2 tablets (50 mg) by mouth early in the morning.. Historical Provider, MD  Active   ergocalciferol (Vitamin D-2) 1.25 MG (93825 UT) capsule [id number]  Take 1 capsule (50,000 Units) by mouth 1 (one) time per week.   Patient not taking: Reported on 2025    Monse Horan MD  Active   ocrelizumab-hyaluronidase-ocsq (OCREVUS ZUNOVO SUBQ) [id number] Yes Inject under the skin. Historical Provider, MD  Active   pantoprazole (ProtoNix) 40 mg EC tablet [id number]  Take 1 tablet (40 mg) by mouth once daily in the morning. Take before meals. Do not crush, chew, or split. Monse Horan MD   24 0555   sertraline (Zoloft) 25 mg tablet [id number]  Take 1  tablet (25 mg) by mouth once daily. Monse Horan MD   24 1626                    Past Medical History:   Diagnosis Date    Bowel obstruction (Multi)     MS (multiple sclerosis) (Multi)        History reviewed. No pertinent surgical history.    ROS  See HPI    Physical Exam  Vitals and nursing note reviewed.   Constitutional:       General: She is not in acute distress.     Appearance: Normal appearance. She is not ill-appearing or toxic-appearing.   Cardiovascular:      Rate and Rhythm: Normal rate and regular rhythm.   Abdominal:      General: There is no distension.      Palpations: Abdomen is soft.      Tenderness: There is no abdominal tenderness. There is no right CVA tenderness, left CVA tenderness, guarding or rebound.   Genitourinary:     General: Normal vulva.      Vagina: No vaginal discharge.   Skin:     General: Skin is warm and dry.   Neurological:      General: No focal deficit present.      Mental Status: She is alert and oriented to person, place, and time.   Psychiatric:         Mood and Affect: Mood normal.         Behavior: Behavior normal.         Recent Results (from the past 96 hours)   POCT UA (nonautomated w/o microscopy) manually resulted    Collection Time: 25  2:46 PM   Result Value Ref Range    POC Color, Urine Yellow Straw, Yellow, Light-Yellow    POC Appearance, Urine Clear Clear    POC Glucose, Urine NEGATIVE NEGATIVE mg/dl    POC Bilirubin, Urine NEGATIVE NEGATIVE    POC Ketones, Urine NEGATIVE NEGATIVE mg/dl    POC Specific Gravity, Urine 1.015 1.005 - 1.035    POC Blood, Urine NEGATIVE NEGATIVE    POC PH, Urine 6.5 No Reference Range Established PH    POC Protein, Urine NEGATIVE NEGATIVE mg/dl    POC Urobilinogen, Urine 0.2 0.2, 1.0 EU/DL    Poc Nitrite, Urine NEGATIVE NEGATIVE    POC Leukocytes, Urine NEGATIVE NEGATIVE   POCT SARS-COV-2/FLU/RSV PCR SYMPTOMATIC manually resulted    Collection Time: 25  3:30 PM   Result Value Ref Range    POC Coronavirus  2019, PCR Not Detected Not Detected    POC Flu A Result Not Detected Not Detected    POC Flu B Result Not Detected Not Detected    POC RSV PCR Not Detected Not Detected   POCT Group A Streptococcus, PCR manually resulted    Collection Time: 04/04/25  3:30 PM   Result Value Ref Range    POC Group A Strep, PCR Not Detected Not Detected       Assessment/Plan/MDM  Jennie was seen today for uti.  Diagnoses and all orders for this visit:  Cognitive change (Primary)  -     POCT UA (nonautomated w/o microscopy) manually resulted    POC unremarkable today and not consistent with UTI. Pt denies any other complaints and plans to follow up with neurologist.  Patient's clinical presentation is otherwise unremarkable at this time. Patient is discharged with instructions to follow-up with primary care or seek emergency medical attention for worsening symptoms or any new concerns.    I did personally review Jennie's past medical history, surgical history, social history, as well as family history (when relevant).  In this case, I also oversaw the her drug management by reviewing her medication list, allergy list, as well as the medications that I prescribed during the UC course and/or recommended as an out-patient (including possible OTC medications such as acetaminophen, NSAIDs , etc).    After reviewing the items above, I did look at previous medical documentation, such as recent hospitalizations, office visits, and/or recent consultations with PCP/specialist.                          SDOH:   Another factor that I considered in Jennie's care was her Social Determinants of Health (SDOH). During this UC encounter, she did not have social determinants of health. Those SDOH influencing Jennie's care are: none      Joon Barney CNP  [facility]  [phone]

## 2025-04-04 ENCOUNTER — TELEPHONE (OUTPATIENT)
Dept: URGENT CARE | Facility: CLINIC | Age: 40
End: 2025-04-04

## 2025-04-04 ENCOUNTER — OFFICE VISIT (OUTPATIENT)
Dept: URGENT CARE | Facility: CLINIC | Age: 40
End: 2025-04-04
Payer: COMMERCIAL

## 2025-04-04 VITALS
BODY MASS INDEX: 46.95 KG/M2 | HEIGHT: 64 IN | RESPIRATION RATE: 16 BRPM | HEART RATE: 77 BPM | SYSTOLIC BLOOD PRESSURE: 135 MMHG | OXYGEN SATURATION: 98 % | DIASTOLIC BLOOD PRESSURE: 87 MMHG | TEMPERATURE: 98.2 F | WEIGHT: 275 LBS

## 2025-04-04 DIAGNOSIS — J06.9 ACUTE UPPER RESPIRATORY INFECTION: Primary | ICD-10-CM

## 2025-04-04 LAB
POC CORONAVIRUS 2019 BY PCR (COV19): NOT DETECTED
POC FLU A RESULT: NOT DETECTED
POC FLU B RESULT: NOT DETECTED
POC GROUP A STREP, PCR: NOT DETECTED
POC RSV PCR: NOT DETECTED

## 2025-04-04 PROCEDURE — 99213 OFFICE O/P EST LOW 20 MIN: CPT | Performed by: NURSE PRACTITIONER

## 2025-04-04 PROCEDURE — 87651 STREP A DNA AMP PROBE: CPT | Mod: QW | Performed by: NURSE PRACTITIONER

## 2025-04-04 NOTE — PROGRESS NOTES
[facility] URGENT CARE  Hue Sheridan, APRN-CNP     Visit Note - 4/4/2025 3:27 PM   This note was generated with voice recognition software and may contain errors including spelling, grammar, syntax, and misrecognization of what was dictated.    Patient: Jennie Melendez, MRN: [id number], 39 y.o., female   PCP: Karthikeyan Lopez PA-C  ------------------------------------  ALLERGIES:   Allergies   Allergen Reactions    Keppra [Levetiracetam] Dizziness        CURRENT MEDICATIONS:   Current Outpatient Medications   Medication Instructions    amphetamine-dextroamphetamine (Adderall) 10 mg tablet 1 tablet, Every 12 hours scheduled (0630,1830)    armodafinil (Nuvigil) 150 mg tablet 1 tablet, Every 12 hours scheduled (0630,1830)    cholecalciferol (VITAMIN D-3) 5,000 Units, Daily RT    desvenlafaxine succinate (Pristiq) 25 mg 24 hour tablet 2 tablets, Daily (0630)    ergocalciferol (VITAMIN D-2) 50,000 Units, oral, Once Weekly    ocrelizumab-hyaluronidase-ocsq (OCREVUS ZUNOVO SUBQ) Inject under the skin.    pantoprazole (PROTONIX) 40 mg, oral, Daily before breakfast, Do not crush, chew, or split.    sertraline (ZOLOFT) 25 mg, oral, Daily     ------------------------------------  PAST MEDICAL HX:  Patient Active Problem List   Diagnosis    Seizures (Multi)      SURGICAL HX:  History reviewed. No pertinent surgical history.   FAMILY HX:   No pertinent history.   SOCIAL HX:    reports that she has never smoked. She has never used smokeless tobacco.  ------------------------------------  CHIEF COMPLAINT:   Chief Complaint   Patient presents with    URI     Headache, congestion, sore throat, x 2 days      HISTORY OF PRESENT ILLNESS: The history was obtained from patientGordo Schneider is a 39 y.o. female, who presents with a chief complaint of     REVIEW OF SYSTEMS:  10 systems reviewed negative with exception of history of present illness as listed above.    TODAY'S VITALS: /87   Pulse 77   Temp 36.8 °C (98.2  "°F)   Resp 16   Ht 1.626 m (5' 4\")   Wt 125 kg (275 lb)   SpO2 98%   BMI 47.20 kg/m²     PHYSICAL EXAMINATION:      ------------------------------------  Medical Decision Making  LABORATORY or RADIOLOGICAL IMAGING ORDERS/RESULTS:   Strep A/COVID/influenza/RSV PCR tests done - results pending.     IMPRESSION/PLAN:  Course: Worsening; stable    1. Acute upper respiratory infection (Primary)  - POCT SARS-COV-2/FLU/RSV PCR SYMPTOMATIC manually resulted  - POCT Group A Streptococcus, PCR manually resulted        ZEYNEP Sanz-CNP   Advanced Practice Provider  [facility] URGENT [facility]  " Respirations easy and unlabored, Breath sounds equal. Lungs are clear to auscultation; no wheezes, rhonchi, or rales; has good air movement throughout. + mild, non-productive cough noted. Non-dyspneic with ambulation; able to maintain SpO2.  Cardiovascular:  Normal rate, Regular rhythm. Normal S1S2. No m/r/g. No peripheral edema.   Gastrointestinal:  Soft, non-tender, non-distended; no palpable masses or organomegaly. Bowel sounds normoactive.  Musculoskeletal:  Grossly normal; appropriate for age.     Integumentary:  Pink, warm, dry, and intact. No rashes or skin discoloration appreciated. Good skin turgor.  Neurologic:  Alert and oriented, no gross deficits.    Cognition and Speech:  Oriented, Speech clear and coherent.    Psychiatric:  Cooperative, Appropriate mood & affect.       ------------------------------------  Medical Decision Making  LABORATORY or RADIOLOGICAL IMAGING ORDERS/RESULTS:   Strep A/COVID/influenza/RSV PCR tests done - results pending.     IMPRESSION/PLAN:  Course: Worsening; stable    1. Acute upper respiratory infection (Primary)  - POCT SARS-COV-2/FLU/RSV PCR SYMPTOMATIC manually resulted  - POCT Group A Streptococcus, PCR manually resulted    No red flags on exam today. Symptoms consistent with viral URI with associated symptoms (of note, has recent household exposure to Fifth Disease), but reviewed other potential etiologies, and strep test and nasal swab obtained (using proper PPE) for influenza/RSV/COVID-19 testing to err on the side of caution. No antibiotics indicated at this point, but encouraged to continue conservative measures. Instructed to push fluids, rest, and to use appropriate over the counter medications as needed for management of symptoms. Advised can review test results on the portal and office will contact pt within the next 24-48 hours. Reviewed instructions for self-isolation and continued monitoring. Reviewed red flags to monitor for, counseled on potential adverse  reactions of treatments, expectations for improvement in sxs, and advised to follow-up with primary care provider in 3-5 days if symptoms persist, or to seek care sooner if worsening or if any additional concerns/red flags develop.  In light of her IV infusion scheduled for next week, patient also encouraged to contact Neuro/specialist to discuss today's visit. Patient agreed with plan of care; questions were encouraged and answered.       ZEYNEP Sanz-CNP   Advanced Practice Provider  [facility] URGENT CARE

## 2025-04-04 NOTE — TELEPHONE ENCOUNTER
Called patient with test results informed her she was negative for covid flu rsv and strep and there was no change to her treatment plan and to let her dr know her results.

## 2025-06-30 ENCOUNTER — HOSPITAL ENCOUNTER (OUTPATIENT)
Dept: VASCULAR MEDICINE | Facility: HOSPITAL | Age: 40
Discharge: HOME | End: 2025-06-30
Payer: COMMERCIAL

## 2025-06-30 DIAGNOSIS — M79.604 PAIN IN RIGHT LEG: ICD-10-CM

## 2025-06-30 DIAGNOSIS — R60.0 LOCALIZED EDEMA: ICD-10-CM

## 2025-06-30 DIAGNOSIS — M79.661 PAIN IN RIGHT LOWER LEG: ICD-10-CM

## 2025-06-30 PROCEDURE — 93971 EXTREMITY STUDY: CPT

## 2025-06-30 PROCEDURE — 93971 EXTREMITY STUDY: CPT | Performed by: SURGERY

## 2025-07-17 ENCOUNTER — EVALUATION (OUTPATIENT)
Dept: PHYSICAL THERAPY | Facility: CLINIC | Age: 40
End: 2025-07-17
Payer: COMMERCIAL

## 2025-07-17 DIAGNOSIS — S86.811D STRAIN OF CALF MUSCLE, RIGHT, SUBSEQUENT ENCOUNTER: Primary | ICD-10-CM

## 2025-07-17 PROBLEM — S86.819A STRAIN OF CALF MUSCLE: Status: ACTIVE | Noted: 2025-07-17

## 2025-07-17 PROCEDURE — 97110 THERAPEUTIC EXERCISES: CPT | Mod: GP

## 2025-07-17 PROCEDURE — 97161 PT EVAL LOW COMPLEX 20 MIN: CPT | Mod: GP

## 2025-07-17 ASSESSMENT — PATIENT HEALTH QUESTIONNAIRE - PHQ9
1. LITTLE INTEREST OR PLEASURE IN DOING THINGS: NOT AT ALL
SUM OF ALL RESPONSES TO PHQ9 QUESTIONS 1 AND 2: 0
2. FEELING DOWN, DEPRESSED OR HOPELESS: NOT AT ALL

## 2025-07-17 ASSESSMENT — PAIN - FUNCTIONAL ASSESSMENT: PAIN_FUNCTIONAL_ASSESSMENT: 0-10

## 2025-07-17 ASSESSMENT — ENCOUNTER SYMPTOMS
DEPRESSION: 0
LOSS OF SENSATION IN FEET: 0
OCCASIONAL FEELINGS OF UNSTEADINESS: 0

## 2025-07-17 ASSESSMENT — PAIN DESCRIPTION - DESCRIPTORS: DESCRIPTORS: ACHING;SHARP

## 2025-07-17 ASSESSMENT — PAIN SCALES - GENERAL: PAINLEVEL_OUTOF10: 3

## 2025-07-17 NOTE — PROGRESS NOTES
"Physical Therapy          Physical Therapy Evaluation          Patient Name: Jennie Melendez     MRN: [id number]     : 1985     Referring Physician: * No referring provider recorded for this case *     Today's Date: 2025     Time Calculation  Start Time: 805  Stop Time: 845  Time Calculation (min): 40 min     PT Evaluation Time Entry  PT Evaluation (Low) Time Entry: 30     PT Therapeutic Procedures Time Entry  Therapeutic Exercise Time Entry: 10                       General     Reason for Referral: Calf strain  Referred By: Karthikeyan Lopez PA-C          Current Problem     Problem List Items Addressed This Visit           ICD-10-CM       Musculoskeletal and Injuries    Strain of calf muscle - Primary S86.819A    Relevant Orders    Follow Up In Physical Therapy                SUBJECTIVE     Subjective      Patient reports that she was standing in a field and felt a \"pop\", which resulted in a tear of calf muscle on 25. Pt was diagnosed c MS last year. This is leading to difficulty with walking, ADLs. Pain is reported to range 0-10/10 with daily activities. Patient states that their goal is to improve ambulation and strength with physical therapy intervention.         Prior level of function: Independent           Patient's name and  were confirmed this date.            Precautions  STEADI Fall Risk Score (The score of 4 or more indicates an increased risk of falling): 2  Precautions Comment: MS                Pain     Pain Assessment: 0-10  0-10 (Numeric) Pain Score: 3  Pain Type: Acute pain  Pain Location: Ankle  Pain Orientation: Right  Pain Descriptors: Aching, Sharp  Pain Frequency: Intermittent               OBJECTIVE:        Lower Extremity Strength:?   MMT 5/5 max??  RIGHT?  LEFT?    Hip Abduction?  ?  ?    Hip ER?  ?  ?    Hip IR?  ?  ?    Hip Flexion?  ??  ??    Hip Ext.?  ??  ??    ?Hip Add.?  ??  ??    ?Knee Flexion?  ??  ??    ?Knee Ext.??  ??  ??    Dorsiflexion?  ? 4 ?    Plantar " "Flexion?  ? 4- ?    Eversion?  ? 4- ?    Inversion?  ? 4 ?    ?  ?  ?    ?   Lower Extremity ROM:?   ?  RIGHT?  LEFT?    Hip Abduction?  ? ° ?    Hip ER?  ?  ?    Hip IR?  ?  ?    Hip Flexion?  ??  ??    Hip Ext.?  ??  ??    ?Hip Add.?  ??  ??    ?Knee Flexion?  ??  ??    ?Knee Ext.??  ??  ??    Dorsiflexion?  ? 6° ?    Plantar Flexion?  ? 40° ?    Eversion?  ? 17° ?    Inversion?  ? 27° ?    ?  ?  ?        Sensation: Recalls dermatomes equally       Gait mechanics: Pt ambulates c bilateral axillary crutches c foot flat contact bilaterally, decreased step length          Palpation: very TTP globally around medial and lateral malleoli, calf mass          Special tests:   Anterior drawer: no obvious laxity  Posterior drawer: no obvious laxity         Other Measures  Lower Extremity Funtional Score (LEFS): 40             TREATMENT:     EXERCISES   Seated foot slide 10x5\"  Towel scrunches x10  Towel EV/INV x10   Gentle gastroc stretch 2x30\"  Gentle soleus stretch 2x30\"    HEP    Access Code: [email or web address]  URL: [email or web address]  Date: 07/17/2025  Prepared by: Vijay Chowdary    Exercises  - Seated Heel Slide  - 2-3 x daily - 7 x weekly - 1-2 sets - 10-15 reps - 5\" hold  - Towel Scrunches  - 2-3 x daily - 7 x weekly - 1-2 sets - 10-15 reps  - Ankle Inversion Eversion Towel Slide  - 2 x daily - 7 x weekly - 1-2 sets - 10-15 reps  - Seated Gastroc Stretch with Strap  - 2 x daily - 7 x weekly - 1 sets - 4 reps - 20\"-30\" hold         PATIENT EDUCATION:   Outpatient Education  Individual(s) Educated: Patient  Education Provided: Anatomy, Body Mechanics, Home Exercise Program, Home Safety, POC  Risk and Benefits Discussed with Patient/Caregiver/Other: yes  Patient/Caregiver Demonstrated Understanding: yes  Plan of Care Discussed and Agreed Upon: yes  Patient Response to Education: Patient/Caregiver Verbalized Understanding of Information            ASSESSEMENT     PT Assessment  PT Assessment Results: " Decreased strength, Decreased range of motion, Decreased mobility, Pain  Rehab Prognosis: Good  Evaluation/Treatment Tolerance: Patient tolerated treatment well    Pt presents due to a recent calf injury from standing in place, c pain, decreased AROM, decreased strength, abnormal gait mechanics, decreased ability to participate in activities of choice, increased need for assistance from others, indicating the need for PT services to return to PLOF.         Rehab potential: Good          PLAN     Treatment/Interventions: Blood flow restriction therapy, Cryotherapy, Education/ Instruction, Electrical stimulation, Gait training, Manual therapy, Neuromuscular re-education, Self care/ home management, Taping techniques, Therapeutic activities, Therapeutic exercises  PT Plan: Skilled PT  PT Frequency: 2 times per week (1-2x/week)  Duration: 4 weeks  Onset Date: 06/27/25  Certification Period Start Date: 07/17/25  Rehab Potential: Good  Plan of Care Agreement: Patient          Pt. Is in agreement with PT plan.     Goals:        Active       PT Problem       PT Goal 1       Start:  07/17/25    Expected End:  10/15/25       Pt will be independent c HEP for progression of strength and functional mobility, in 2 weeks.           PT Goal 2       Start:  07/17/25    Expected End:  10/15/25       Pt will have <=1/10 pain for one week, for improved QOL, in 3 weeks.           PT Goal 3       Start:  07/17/25    Expected End:  10/15/25       Pt will improve R ankle AROM to 20° of dorsiflexion, 20° of EV, 30° of INV, for improved gait mechanics, in 4 weeks.           PT Goal 4       Start:  07/17/25    Expected End:  10/15/25       Pt will increase RLE strength to >/=4+/5 or greater for improved gait and mobility efficiency, in 4 weeks.           PT Goal 5       Start:  07/17/25    Expected End:  10/15/25       Pt will improve LEFS to >/=58/80, indicating improved functional mobility, in 4 weeks.           Patient Stated Goal 1        Start:  07/17/25    Expected End:  10/15/25       Patient states that their goal is to improve ambulation and strength with physical therapy intervention.

## 2025-07-23 ENCOUNTER — TREATMENT (OUTPATIENT)
Dept: PHYSICAL THERAPY | Facility: CLINIC | Age: 40
End: 2025-07-23
Payer: COMMERCIAL

## 2025-07-23 DIAGNOSIS — S86.811D STRAIN OF CALF MUSCLE, RIGHT, SUBSEQUENT ENCOUNTER: ICD-10-CM

## 2025-07-23 PROCEDURE — 97110 THERAPEUTIC EXERCISES: CPT | Mod: GP,CQ

## 2025-07-23 ASSESSMENT — PAIN SCALES - GENERAL: PAINLEVEL_OUTOF10: 0 - NO PAIN

## 2025-07-23 ASSESSMENT — PAIN - FUNCTIONAL ASSESSMENT: PAIN_FUNCTIONAL_ASSESSMENT: 0-10

## 2025-07-23 NOTE — PROGRESS NOTES
"Physical Therapy Treatment    Patient Name: Jennie Melendez  MRN: [id number]  Today's Date: 7/23/2025  Time Calculation  Start Time: 0819  Stop Time: 0901  Time Calculation (min): 42 min  PT Therapeutic Procedures Time Entry  Therapeutic Exercise Time Entry: 38       Assessment:   Patient identified by name and date of birth. Patient demonstrated good tolerance to exercise w/o increased Sx noted. She demonstrated good understanding with cueing at times for form.     OBJECTIVE     Plan:  OP PT Plan  Treatment/Interventions: Blood flow restriction therapy, Cryotherapy, Education/ Instruction, Electrical stimulation, Gait training, Manual therapy, Neuromuscular re-education, Self care/ home management, Taping techniques, Therapeutic activities, Therapeutic exercises  PT Plan: Skilled PT  PT Frequency: 2 times per week (1-2x/week)  Duration: 4 weeks  Onset Date: 06/27/25  Certification Period Start Date: 07/17/25  Rehab Potential: Good  Plan of Care Agreement: Patient  Continue with ROM and strengthening for increased ease in ambulation.   Current Problem  Problem List Items Addressed This Visit           ICD-10-CM    Strain of calf muscle S86.819A       Subjective Patient reported compliance with % of the time and verbalized understanding.  She reported 0/10 pain after treatment with report of ankle discomfort with exercises. She reported she feels most of her discomfort comes from her ankle.   General  Reason for Referral: Calf strain  Referred By: Karthikeyan Lopez PA-C  Precautions  Precautions  Precautions Comment: MS    Pain  Pain Assessment: 0-10  0-10 (Numeric) Pain Score: 0 - No pain  Pain Type: Acute pain  Pain Location: Ankle  Pain Orientation: Right     Treatments:  Therapeutic Exercise  Therapeutic Exercise Performed: Yes  Seated stepper 5' (N)  Seated foot slide 10x5\"   Towel scrunches 2 x 1'   Towel EV/INV x10   Gentle gastroc stretch 3x30\"  Gentle soleus stretch 3x30\"  Seated heel/toe raises 2 x 10 " "  Baps board  level 2  -Iv/EV, PF/DF, CW, CCW x10     OP EDUCATION:   HEP    Access Code: [email or web address]  URL: [email or web address]  Date: 07/17/2025  Prepared by: Vijay Chowdary     Exercises  - Seated Heel Slide  - 2-3 x daily - 7 x weekly - 1-2 sets - 10-15 reps - 5\" hold  - Towel Scrunches  - 2-3 x daily - 7 x weekly - 1-2 sets - 10-15 reps  - Ankle Inversion Eversion Towel Slide  - 2 x daily - 7 x weekly - 1-2 sets - 10-15 reps  - Seated Gastroc Stretch with Strap  - 2 x daily - 7 x weekly - 1 sets - 4 reps - 20\"-30\" hold    Goals:  Active       PT Problem       PT Goal 1       Start:  07/17/25    Expected End:  10/15/25       Pt will be independent c HEP for progression of strength and functional mobility, in 2 weeks.           PT Goal 2       Start:  07/17/25    Expected End:  10/15/25       Pt will have <=1/10 pain for one week, for improved QOL, in 3 weeks.           PT Goal 3       Start:  07/17/25    Expected End:  10/15/25       Pt will improve R ankle AROM to 20° of dorsiflexion, 20° of EV, 30° of INV, for improved gait mechanics, in 4 weeks.           PT Goal 4       Start:  07/17/25    Expected End:  10/15/25       Pt will increase RLE strength to >/=4+/5 or greater for improved gait and mobility efficiency, in 4 weeks.           PT Goal 5       Start:  07/17/25    Expected End:  10/15/25       Pt will improve LEFS to >/=58/80, indicating improved functional mobility, in 4 weeks.           Patient Stated Goal 1       Start:  07/17/25    Expected End:  10/15/25       Patient states that their goal is to improve ambulation and strength with physical therapy intervention.               "

## 2025-07-25 ENCOUNTER — TREATMENT (OUTPATIENT)
Dept: PHYSICAL THERAPY | Facility: CLINIC | Age: 40
End: 2025-07-25
Payer: COMMERCIAL

## 2025-07-25 DIAGNOSIS — S86.811D STRAIN OF CALF MUSCLE, RIGHT, SUBSEQUENT ENCOUNTER: ICD-10-CM

## 2025-07-25 PROCEDURE — 97110 THERAPEUTIC EXERCISES: CPT | Mod: GP,CQ | Performed by: PHYSICAL THERAPY ASSISTANT

## 2025-07-25 ASSESSMENT — PAIN - FUNCTIONAL ASSESSMENT: PAIN_FUNCTIONAL_ASSESSMENT: 0-10

## 2025-07-25 ASSESSMENT — PAIN SCALES - GENERAL: PAINLEVEL_OUTOF10: 0 - NO PAIN

## 2025-07-25 NOTE — PROGRESS NOTES
"Physical Therapy    Physical Therapy Treatment    Patient Name: Jennie Melendez  MRN: [id number]  Today's Date: 2025  Time Calculation  Start Time: 0830  Stop Time: 915  Time Calculation (min): 45 min  PT Therapeutic Procedures Time Entry  Therapeutic Exercise Time Entry: 41                   Current Problem  Problem List Items Addressed This Visit           ICD-10-CM       Musculoskeletal and Injuries    Strain of calf muscle S86.819A        General  Reason for Referral: Calf strain  Referred By: Karthikeyan Lopez PA-C  General Comment: Visit 3    Subjective   Patient reports no falls since last visit. And states 0/10 pain today in right calf/ankle region.    Precautions  Precautions  Precautions Comment: MS    Pain  Pain Assessment: 0-10  0-10 (Numeric) Pain Score: 0 - No pain  Pain Type: Acute pain  Pain Location: Ankle  Pain Orientation: Right    Objective     Therapeutic Exercise  Therapeutic Exercise Performed: Yes  Seated stepper 5'   Seated foot slide 10x5\"   Towel scrunches 2 x 1'   Towel EV/INV x10   Gentle gastroc stretch 3x30\"  Gentle soleus stretch 3x30\"  Seated heel/toe raises 2 x 10   Baps board  level 2  -Iv/EV, PF/DF, CW, CCW x10   Standing calf stretch x 2 0ne minute ea.  N  Step ups 6\" step x 20  N  SLS x 30\" x 2 //bars  N  Ankle strengthening, DF/PF, IN/EV   orange Tband 2x10 eeach  N    Treatments:    OP Education      Assessment  Patient tolerated treatment without increased pain.  Patient states easier to don shoe post treatment.  Patient has good form/understanding of there-ex and reports compliance with HEP daily.  Continue with right ankle and calf there-ex to build strength, improve motion for ADL.  Patient verified by name and .    Plan Continue with right ankle and calf there-ex to improve ambulation, standing for ADL.  Treatment/Interventions: Blood flow restriction therapy, Cryotherapy, Education/ Instruction, Electrical stimulation, Gait training, Manual therapy, Neuromuscular " re-education, Self care/ home management, Taping techniques, Therapeutic activities, Therapeutic exercises  PT Plan: Skilled PT  PT Frequency: 2 times per week (1-2x/week)  Duration: 4 weeks  Onset Date: 06/27/25  Certification Period Start Date: 07/17/25  Rehab Potential: Good  Plan of Care Agreement: Patient    Active       PT Problem       PT Goal 1       Start:  07/17/25    Expected End:  10/15/25       Pt will be independent c HEP for progression of strength and functional mobility, in 2 weeks.           PT Goal 2       Start:  07/17/25    Expected End:  10/15/25       Pt will have <=1/10 pain for one week, for improved QOL, in 3 weeks.           PT Goal 3       Start:  07/17/25    Expected End:  10/15/25       Pt will improve R ankle AROM to 20° of dorsiflexion, 20° of EV, 30° of INV, for improved gait mechanics, in 4 weeks.           PT Goal 4       Start:  07/17/25    Expected End:  10/15/25       Pt will increase RLE strength to >/=4+/5 or greater for improved gait and mobility efficiency, in 4 weeks.           PT Goal 5       Start:  07/17/25    Expected End:  10/15/25       Pt will improve LEFS to >/=58/80, indicating improved functional mobility, in 4 weeks.           Patient Stated Goal 1       Start:  07/17/25    Expected End:  10/15/25       Patient states that their goal is to improve ambulation and strength with physical therapy intervention.

## 2025-07-29 ENCOUNTER — APPOINTMENT (OUTPATIENT)
Dept: PHYSICAL THERAPY | Facility: CLINIC | Age: 40
End: 2025-07-29
Payer: COMMERCIAL

## 2025-08-05 ENCOUNTER — DOCUMENTATION (OUTPATIENT)
Dept: PHYSICAL THERAPY | Facility: CLINIC | Age: 40
End: 2025-08-05
Payer: COMMERCIAL

## 2025-08-05 NOTE — PROGRESS NOTES
Physical Therapy                 Therapy Communication Note    Patient Name: Jennie Melendez  MRN: [id number]  Department:   Room: Room/bed info not found  Today's Date: 8/5/2025     Discipline: Physical Therapy    Missed Visit:       Missed Visit Reason:      Missed Time: Cancel    Comment: